# Patient Record
Sex: FEMALE | Race: BLACK OR AFRICAN AMERICAN | URBAN - METROPOLITAN AREA
[De-identification: names, ages, dates, MRNs, and addresses within clinical notes are randomized per-mention and may not be internally consistent; named-entity substitution may affect disease eponyms.]

---

## 2017-01-09 DIAGNOSIS — D46.9 MYELODYSPLASTIC SYNDROME (HCC): ICD-10-CM

## 2017-01-09 RX ORDER — OXYCODONE AND ACETAMINOPHEN 10; 325 MG/1; MG/1
1 TABLET ORAL
Qty: 180 TAB | Refills: 0 | Status: SHIPPED | OUTPATIENT
Start: 2017-01-09 | End: 2017-02-06 | Stop reason: SDUPTHER

## 2017-02-06 ENCOUNTER — OFFICE VISIT (OUTPATIENT)
Dept: ONCOLOGY | Age: 67
End: 2017-02-06

## 2017-02-06 ENCOUNTER — HOSPITAL ENCOUNTER (OUTPATIENT)
Dept: ONCOLOGY | Age: 67
Discharge: HOME | End: 2017-02-06

## 2017-02-06 ENCOUNTER — HOSPITAL ENCOUNTER (OUTPATIENT)
Dept: LAB | Age: 67
Discharge: HOME | End: 2017-02-06
Payer: MEDICARE

## 2017-02-06 VITALS
SYSTOLIC BLOOD PRESSURE: 157 MMHG | HEART RATE: 79 BPM | BODY MASS INDEX: 27.67 KG/M2 | TEMPERATURE: 97.9 F | DIASTOLIC BLOOD PRESSURE: 79 MMHG | WEIGHT: 182 LBS

## 2017-02-06 DIAGNOSIS — D46.9 MYELODYSPLASTIC SYNDROME (HCC): ICD-10-CM

## 2017-02-06 DIAGNOSIS — D46.9 MYELODYSPLASTIC SYNDROME, UNSPECIFIED (HCC): Primary | ICD-10-CM

## 2017-02-06 DIAGNOSIS — D46.9 MYELODYSPLASTIC SYNDROME, UNSPECIFIED (HCC): ICD-10-CM

## 2017-02-06 DIAGNOSIS — M19.90 ARTHRITIS: ICD-10-CM

## 2017-02-06 LAB
ALBUMIN SERPL BCP-MCNC: 4.3 G/DL (ref 3.4–5)
ALBUMIN/GLOB SERPL: 1.1 {RATIO} (ref 0.8–1.7)
ALP SERPL-CCNC: 101 U/L (ref 45–117)
ALT SERPL-CCNC: 23 U/L (ref 13–56)
ANION GAP BLD CALC-SCNC: 12 MMOL/L (ref 3–18)
AST SERPL W P-5'-P-CCNC: 28 U/L (ref 15–37)
BASO+EOS+MONOS # BLD AUTO: 0.2 K/UL (ref 0–2.3)
BASO+EOS+MONOS # BLD AUTO: 4 % (ref 0.1–17)
BILIRUB SERPL-MCNC: 0.4 MG/DL (ref 0.2–1)
BUN SERPL-MCNC: 8 MG/DL (ref 7–18)
BUN/CREAT SERPL: 16 (ref 12–20)
CALCIUM SERPL-MCNC: 10.2 MG/DL (ref 8.5–10.1)
CHLORIDE SERPL-SCNC: 108 MMOL/L (ref 100–108)
CO2 SERPL-SCNC: 23 MMOL/L (ref 21–32)
CREAT SERPL-MCNC: 0.49 MG/DL (ref 0.6–1.3)
DIFFERENTIAL METHOD BLD: ABNORMAL
ERYTHROCYTE [DISTWIDTH] IN BLOOD BY AUTOMATED COUNT: 19 % (ref 11.5–14.5)
FERRITIN SERPL-MCNC: 69 NG/ML (ref 8–388)
GLOBULIN SER CALC-MCNC: 3.8 G/DL (ref 2–4)
GLUCOSE SERPL-MCNC: 106 MG/DL (ref 74–99)
HCT VFR BLD AUTO: 36.2 % (ref 36–48)
HGB BLD-MCNC: 11.5 G/DL (ref 12–16)
IRON SATN MFR SERPL: 12 %
IRON SERPL-MCNC: 34 UG/DL (ref 50–175)
LYMPHOCYTES # BLD AUTO: 20 % (ref 14–44)
LYMPHOCYTES # BLD: 1.2 K/UL (ref 1.1–5.9)
MCH RBC QN AUTO: 28.2 PG (ref 25–35)
MCHC RBC AUTO-ENTMCNC: 31.8 G/DL (ref 31–37)
MCV RBC AUTO: 88.7 FL (ref 78–102)
NEUTS SEG # BLD: 4.4 K/UL (ref 1.8–9.5)
NEUTS SEG NFR BLD AUTO: 76 % (ref 40–70)
PLATELET # BLD AUTO: 313 K/UL (ref 140–440)
POTASSIUM SERPL-SCNC: 4.1 MMOL/L (ref 3.5–5.5)
PROT SERPL-MCNC: 8.1 G/DL (ref 6.4–8.2)
RBC # BLD AUTO: 4.08 M/UL (ref 4.1–5.1)
SODIUM SERPL-SCNC: 143 MMOL/L (ref 136–145)
TIBC SERPL-MCNC: 279 UG/DL (ref 250–450)
WBC # BLD AUTO: 5.8 K/UL (ref 4.5–13)

## 2017-02-06 PROCEDURE — 36415 COLL VENOUS BLD VENIPUNCTURE: CPT | Performed by: NURSE PRACTITIONER

## 2017-02-06 PROCEDURE — 80053 COMPREHEN METABOLIC PANEL: CPT | Performed by: NURSE PRACTITIONER

## 2017-02-06 PROCEDURE — 83540 ASSAY OF IRON: CPT | Performed by: NURSE PRACTITIONER

## 2017-02-06 PROCEDURE — 82306 VITAMIN D 25 HYDROXY: CPT | Performed by: NURSE PRACTITIONER

## 2017-02-06 PROCEDURE — 82728 ASSAY OF FERRITIN: CPT | Performed by: NURSE PRACTITIONER

## 2017-02-06 RX ORDER — LANOLIN ALCOHOL/MO/W.PET/CERES
325 CREAM (GRAM) TOPICAL
Qty: 90 TAB | Refills: 3 | Status: SHIPPED | OUTPATIENT
Start: 2017-02-06

## 2017-02-06 RX ORDER — OXYCODONE AND ACETAMINOPHEN 10; 325 MG/1; MG/1
1 TABLET ORAL
Qty: 180 TAB | Refills: 0 | Status: SHIPPED | OUTPATIENT
Start: 2017-02-06 | End: 2017-03-06 | Stop reason: SDUPTHER

## 2017-02-06 NOTE — MR AVS SNAPSHOT
Visit Information Date & Time Provider Department Dept. Phone Encounter #  
 2/6/2017 11:00 AM Thomas Thrasher MD [facility] Medical Oncology [phone] [id number] Follow-up Instructions Return in about 3 months (around 5/6/2017). Your Appointments 5/1/2017 10:30 AM  
Office Visit with Thomas Thrasher MD  
[address]  Oncology [facility]) Appt Note: 3 month follow up appointment [facility], [address], [address] [place] [address] Upcoming Health Maintenance Date Due Hepatitis C Screening 1950 DTaP/Tdap/Td series (1 - Tdap) 11/19/1969 BREAST CANCER SCRN MAMMOGRAM 11/19/1998 FOBT Q 1 YEAR AGE 50-75 11/19/1998 ZOSTER VACCINE AGE 60> 11/19/2008 GLAUCOMA SCREENING Q2Y 11/19/2013 OSTEOPOROSIS SCREENING (DEXA) 11/19/2013 Pneumococcal 65+ High/Highest Risk (1 of 2 - PCV13) 11/19/2013 MEDICARE YEARLY EXAM 11/19/2013 INFLUENZA AGE 9 TO ADULT 8/1/2016 Allergies as of 2/6/2017  Review Complete On: 2/6/2017 By: Cady Gr NP No Known Allergies Current Immunizations  Never Reviewed No immunizations on file. Not reviewed this visit You Were Diagnosed With   
  
 Codes Comments Myelodysplastic syndrome, unspecified ([facility] [address]    -  Primary ICD-10-CM: D46.9 ICD-9-CM: 238.75 Arthritis     ICD-10-CM: M19.90 ICD-9-CM: 716.90 Myelodysplastic syndrome ([address]     ICD-10-CM: D46.9 ICD-9-CM: 238.75 Vitals BP Pulse Temp Weight(growth percentile) BMI Smoking Status 157/79 79 97.9 °F (36.6 °C) 182 lb (82.6 kg) 27.67 kg/m2 Current Every Day Smoker Vitals History BMI and BSA Data Body Mass Index Body Surface Area  
 27.67 kg/m 2 1.99 m 2 Preferred Pharmacy Pharmacy Name Phone [address] [facility], [address] [place] [address] Your Updated Medication List  
  
   
This list is accurate as of: 2/6/17 12:12 PM.  Always use your most recent med list. amLODIPine 10 mg tablet Commonly known as:  [place] Sae Take  by mouth daily. benazepril 20 mg tablet Commonly known as:  LOTENSIN Take 20 mg by mouth daily. cyclobenzaprine 10 mg tablet Commonly known as:  FLEXERIL Take 1 Tab by mouth three (3) times daily as needed for Muscle Spasm(s). ergocalciferol 50,000 unit capsule Commonly known as:  VITAMIN D2 Take 1 Cap by mouth every seven (7) days. ferrous sulfate 325 mg (65 mg iron) tablet Take 1 Tab by mouth Daily (before breakfast). gabapentin 300 mg capsule Commonly known as:  NEURONTIN  
  
 oxyCODONE-acetaminophen  mg per tablet Commonly known as:  PERCOCET Take 1 Tab by mouth every four (4) hours as needed for Pain.  
  
 pantoprazole 40 mg tablet Commonly known as:  PROTONIX Take 1 Tab by mouth daily. pravastatin 20 mg tablet Commonly known as:  PRAVACHOL Take 20 mg by mouth nightly. PROAIR HFA 90 mcg/actuation inhaler Generic drug:  albuterol Prescriptions Printed Refills  
 oxyCODONE-acetaminophen (PERCOCET)  mg per tablet 0 Sig: Take 1 Tab by mouth every four (4) hours as needed for Pain. Class: Print Route: Oral  
  
Prescriptions Sent to Pharmacy Refills  
 ferrous sulfate 325 mg (65 mg iron) tablet 3 Sig: Take 1 Tab by mouth Daily (before breakfast). Class: Normal  
 Pharmacy: [id number] [facility], [address] [facility] Ph #: [phone] Route: Oral  
  
We Performed the Following COMPLETE CBC & AUTO DIFF WBC [46461 CPT(R)] Follow-up Instructions Return in about 3 months (around 5/6/2017). To-Do List   
 02/06/2017 Lab:  CBC WITH 3 PART DIFF   
  
 02/06/2017 Lab: VITAMIN D, 25 HYDROXY   
  
 02/07/2017 Lab:  FERRITIN   
  
 02/07/2017 Lab:  IRON PROFILE   
  
 02/07/2017 Lab:  METABOLIC PANEL, COMPREHENSIVE Introducing [facility]! Benjamín Garner introduces [facility] patient portal. Now you can access parts of your medical record, email your doctor's office, and request medication refills online. 1. In your internet browser, go to [email or web address] 2. Click on the First Time User? Click Here link in the Sign In box. You will see the New Member Sign Up page. 3. Enter your [facility] Access Code exactly as it appears below. You will not need to use this code after youve completed the sign-up process. If you do not sign up before the expiration date, you must request a new code. · [facility] Access Code: [email or web address] Expires: 5/7/2017 11:06 AM 
 
4. Enter the last four digits of your Social Security Number (xxxx) and Date of Birth (mm/dd/yyyy) as indicated and click Submit. You will be taken to the next sign-up page. 5. Create a [facility] ID. This will be your [facility] login ID and cannot be changed, so think of one that is secure and easy to remember. 6. Create a [facility] password. You can change your password at any time. 7. Enter your Password Reset Question and Answer. This can be used at a later time if you forget your password. 8. Enter your e-mail address. You will receive e-mail notification when new information is available in [address] 9. Click Sign Up. You can now view and download portions of your medical record. 10. Click the Download Summary menu link to download a portable copy of your medical information. If you have questions, please visit the Frequently Asked Questions section of the [facility] website. Remember, [facility] is NOT to be used for urgent needs. For medical emergencies, dial 911. Now available from your iPhone and Android! Please provide this summary of care documentation to your next provider. Your primary care clinician is listed as Grisel Davenport. If you have any questions after today's visit, please call [phone].

## 2017-02-07 LAB — 25(OH)D3 SERPL-MCNC: 8.9 NG/ML (ref 30–100)

## 2017-02-13 RX ORDER — ERGOCALCIFEROL 1.25 MG/1
50000 CAPSULE ORAL
Qty: 12 CAP | Refills: 0 | Status: SHIPPED | OUTPATIENT
Start: 2017-02-13 | End: 2017-05-31 | Stop reason: SDUPTHER

## 2017-03-06 DIAGNOSIS — D46.9 MYELODYSPLASTIC SYNDROME (HCC): ICD-10-CM

## 2017-03-07 RX ORDER — OXYCODONE AND ACETAMINOPHEN 10; 325 MG/1; MG/1
1 TABLET ORAL
Qty: 180 TAB | Refills: 0 | Status: SHIPPED | OUTPATIENT
Start: 2017-03-07 | End: 2017-04-04 | Stop reason: SDUPTHER

## 2017-04-04 DIAGNOSIS — D46.9 MYELODYSPLASTIC SYNDROME (HCC): ICD-10-CM

## 2017-04-04 RX ORDER — OXYCODONE AND ACETAMINOPHEN 10; 325 MG/1; MG/1
1 TABLET ORAL
Qty: 180 TAB | Refills: 0 | Status: SHIPPED | OUTPATIENT
Start: 2017-04-04 | End: 2017-05-02 | Stop reason: SDUPTHER

## 2017-05-01 ENCOUNTER — OFFICE VISIT (OUTPATIENT)
Dept: ONCOLOGY | Age: 67
End: 2017-05-01

## 2017-05-01 ENCOUNTER — HOSPITAL ENCOUNTER (OUTPATIENT)
Dept: ONCOLOGY | Age: 67
Discharge: HOME | End: 2017-05-01

## 2017-05-01 DIAGNOSIS — D46.9 MYELODYSPLASTIC SYNDROME, UNSPECIFIED (HCC): Primary | ICD-10-CM

## 2017-05-01 DIAGNOSIS — D46.9 MYELODYSPLASTIC SYNDROME, UNSPECIFIED (HCC): ICD-10-CM

## 2017-05-02 DIAGNOSIS — D46.9 MYELODYSPLASTIC SYNDROME (HCC): ICD-10-CM

## 2017-05-02 RX ORDER — NALOXONE HYDROCHLORIDE 4 MG/.1ML
1 SPRAY NASAL ONCE
Qty: 1 BOX | Refills: 0 | Status: SHIPPED | OUTPATIENT
Start: 2017-05-02 | End: 2017-05-02

## 2017-05-02 RX ORDER — OXYCODONE AND ACETAMINOPHEN 10; 325 MG/1; MG/1
1 TABLET ORAL
Qty: 180 TAB | Refills: 0 | Status: SHIPPED | OUTPATIENT
Start: 2017-05-02 | End: 2017-05-31 | Stop reason: SDUPTHER

## 2017-05-31 ENCOUNTER — OFFICE VISIT (OUTPATIENT)
Dept: ONCOLOGY | Age: 67
End: 2017-05-31

## 2017-05-31 ENCOUNTER — HOSPITAL ENCOUNTER (OUTPATIENT)
Dept: ONCOLOGY | Age: 67
Discharge: HOME | End: 2017-05-31

## 2017-05-31 VITALS
SYSTOLIC BLOOD PRESSURE: 140 MMHG | BODY MASS INDEX: 28.19 KG/M2 | TEMPERATURE: 98.3 F | DIASTOLIC BLOOD PRESSURE: 69 MMHG | HEART RATE: 77 BPM | WEIGHT: 185.4 LBS

## 2017-05-31 DIAGNOSIS — D46.4 REFRACTORY ANEMIA DUE TO MYELODYSPLASTIC SYNDROME (HCC): ICD-10-CM

## 2017-05-31 DIAGNOSIS — E55.9 VITAMIN D DEFICIENCY: ICD-10-CM

## 2017-05-31 DIAGNOSIS — M19.90 ARTHRITIS: ICD-10-CM

## 2017-05-31 DIAGNOSIS — D46.9 MYELODYSPLASTIC SYNDROME (HCC): ICD-10-CM

## 2017-05-31 DIAGNOSIS — D46.9 MYELODYSPLASTIC SYNDROME, UNSPECIFIED (HCC): ICD-10-CM

## 2017-05-31 DIAGNOSIS — D46.9 MYELODYSPLASTIC SYNDROME, UNSPECIFIED (HCC): Primary | ICD-10-CM

## 2017-05-31 LAB
BASO+EOS+MONOS # BLD AUTO: 0.3 K/UL (ref 0–2.3)
BASO+EOS+MONOS # BLD AUTO: 5 % (ref 0.1–17)
DIFFERENTIAL METHOD BLD: ABNORMAL
ERYTHROCYTE [DISTWIDTH] IN BLOOD BY AUTOMATED COUNT: 14.7 % (ref 11.5–14.5)
HCT VFR BLD AUTO: 35.3 % (ref 36–48)
HGB BLD-MCNC: 11.7 G/DL (ref 12–16)
LYMPHOCYTES # BLD AUTO: 15 % (ref 14–44)
LYMPHOCYTES # BLD: 0.9 K/UL (ref 1.1–5.9)
MCH RBC QN AUTO: 32.1 PG (ref 25–35)
MCHC RBC AUTO-ENTMCNC: 33.1 G/DL (ref 31–37)
MCV RBC AUTO: 97 FL (ref 78–102)
NEUTS SEG # BLD: 4.7 K/UL (ref 1.8–9.5)
NEUTS SEG NFR BLD AUTO: 80 % (ref 40–70)
PLATELET # BLD AUTO: 269 K/UL (ref 140–440)
RBC # BLD AUTO: 3.64 M/UL (ref 4.1–5.1)
WBC # BLD AUTO: 5.9 K/UL (ref 4.5–13)

## 2017-05-31 RX ORDER — ERGOCALCIFEROL 1.25 MG/1
50000 CAPSULE ORAL
Qty: 12 CAP | Refills: 0 | Status: SHIPPED | OUTPATIENT
Start: 2017-05-31 | End: 2017-11-29 | Stop reason: SDUPTHER

## 2017-05-31 RX ORDER — IRON POLYSACCHARIDE COMPLEX 150 MG
150 CAPSULE ORAL 2 TIMES DAILY
Qty: 90 CAP | Refills: 6 | Status: SHIPPED | OUTPATIENT
Start: 2017-05-31

## 2017-05-31 RX ORDER — OXYCODONE AND ACETAMINOPHEN 10; 325 MG/1; MG/1
1 TABLET ORAL
Qty: 180 TAB | Refills: 0 | Status: SHIPPED | OUTPATIENT
Start: 2017-05-31 | End: 2017-06-27 | Stop reason: SDUPTHER

## 2017-05-31 NOTE — MR AVS SNAPSHOT
Visit Information Date & Time Provider Department Dept. Phone Encounter #  
 5/31/2017 11:00 AM Vanessa Baptiste MD [facility] Medical Oncology [phone] [id number] Follow-up Instructions Return in about 3 months (around 8/31/2017). Your Appointments 8/30/2017 11:00 AM  
Office Visit with Vanessa Baptiste MD  
[address]  Oncology [facility]) Appt Note: [facility], [address] [place] [address] Upcoming Health Maintenance Date Due Hepatitis C Screening 1950 DTaP/Tdap/Td series (1 - Tdap) 11/19/1969 BREAST CANCER SCRN MAMMOGRAM 11/19/1998 FOBT Q 1 YEAR AGE 50-75 11/19/1998 ZOSTER VACCINE AGE 60> 11/19/2008 GLAUCOMA SCREENING Q2Y 11/19/2013 OSTEOPOROSIS SCREENING (DEXA) 11/19/2013 Pneumococcal 65+ High/Highest Risk (1 of 2 - PCV13) 11/19/2013 MEDICARE YEARLY EXAM 11/19/2013 INFLUENZA AGE 9 TO ADULT 8/1/2017 Allergies as of 5/31/2017  Review Complete On: 5/31/2017 By: Vanessa Baptiste MD  
 No Known Allergies Current Immunizations  Never Reviewed No immunizations on file. Not reviewed this visit You Were Diagnosed With   
  
 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  
 28.19 kg/m 2 2.01 m 2 Preferred Pharmacy Pharmacy Name Phone [address] [facility], [address] Your Updated Medication List  
  
   
This list is accurate as of: 5/31/17 12:44 PM.  Always use your most recent med list. amLODIPine 10 mg tablet Commonly known as:  Delphina Peat Take  by mouth daily. benazepril 20 mg tablet Commonly known as:  LOTENSIN Take 20 mg by mouth daily. cyclobenzaprine 10 mg tablet Commonly known as:  FLEXERIL Take 1 Tab by mouth three (3) times daily as needed for Muscle Spasm(s). ergocalciferol 50,000 unit capsule Commonly known as:  VITAMIN D2 Take 1 Cap by mouth every seven (7) days. Indications: VITAMIN D DEFICIENCY  
  
 ferrous sulfate 325 mg (65 mg iron) tablet Take 1 Tab by mouth Daily (before breakfast). gabapentin 300 mg capsule Commonly known as:  NEURONTIN  
  
 iron polysaccharides 150 mg iron capsule Commonly known as:  FERREX 150 Take 1 Cap by mouth two (2) times a day. oxyCODONE-acetaminophen  mg per tablet Commonly known as:  PERCOCET Take 1 Tab by mouth every four (4) hours as needed for Pain.  
  
 pantoprazole 40 mg tablet Commonly known as:  PROTONIX Take 1 Tab by mouth daily. pravastatin 20 mg tablet Commonly known as:  PRAVACHOL Take 20 mg by mouth nightly. PROAIR HFA 90 mcg/actuation inhaler Generic drug:  albuterol Prescriptions Printed Refills  
 iron polysaccharides (FERREX 150) 150 mg iron capsule 6 Sig: Take 1 Cap by mouth two (2) times a day. Class: Print Route: Oral  
 ergocalciferol (VITAMIN D2) 50,000 unit capsule 0 Sig: Take 1 Cap by mouth every seven (7) days. Indications: VITAMIN D DEFICIENCY Class: Print  Route: Oral  
 oxyCODONE-acetaminophen (PERCOCET)  mg per tablet 0  
 Sig: Take 1 Tab by mouth every four (4) hours as needed for Pain. Class: Print Route: Oral  
  
We Performed the Following COMPLETE CBC & AUTO DIFF WBC [15932 CPT(R)] Follow-up Instructions Return in about 3 months (around 8/31/2017). To-Do List   
 05/31/2017 Lab:  CBC WITH 3 PART DIFF Patient Instructions Myelodysplastic Syndromes: Care Instructions Your Care Instructions Myelodysplastic syndromes, also called MDS, are a group of rare conditions in which the bone marrow does not make enough healthy blood cells. Normally, the bone marrow makes red blood cells, white blood cells, and platelets. These cells carry oxygen in the blood, help the body fight infections, and help the blood clot. With MDS, you may feel weak and tired, get infections often, and bruise easily, although symptoms tend to vary. MDS is a form of blood cancer. In some cases, MDS can turn into acute myeloid leukemia (AML), another type of cancer. Some people develop MDS after treatment for cancer or exposure to pesticides or other chemicals. But in most cases, the cause of MDS is not known. Your doctor will use the results of blood tests to guide your treatment. There are many types of MDS, with different treatment plans for each. If you have enough red blood cells and are feeling all right, you may not need active treatment, but you and your doctor will want to watch your condition carefully. If you start feeling lightheaded and have no energy, you may need a blood transfusion. Your doctor also may give you antibiotics to prevent or treat infection. Follow-up care is a key part of your treatment and safety. Be sure to make and go to all appointments, and call your doctor if you are having problems. It's also a good idea to know your test results and keep a list of the medicines you take. How can you care for yourself at home? · Take your medicines exactly as prescribed. Call your doctor if you think you are having a problem with your medicine. You will get more details on the specific medicines your doctor prescribes. · If your doctor prescribed antibiotics, take them as directed. Do not stop taking them just because you feel better. You need to take the full course of antibiotics. If you have side effects from antibiotics, tell your doctor. · Take steps to control your stress and workload. Learn relaxation techniques. ¨ Share your feelings. Stress and tension affect our emotions. By expressing your feelings to others, you may be able to understand and cope with them. ¨ Consider joining a support group. Talking about a problem with your spouse, a good friend, or other people with similar problems is a good way to reduce tension and stress. ¨ Express yourself with art. Try writing, crafts, dance, or art to relieve stress. ¨ Be kind to your body and mind. Getting enough sleep, eating a healthy diet, and taking time to do things you enjoy can contribute to an overall feeling of balance in your life and help reduce stress. ¨ Get help if you need it. Discuss your concerns with your doctor or counselor. · Do not smoke. Smoking can make blood problems worse. If you need help quitting, talk to your doctor about stop-smoking programs and medicines. These can increase your chances of quitting for good. · If you have not already done so, prepare a list of advance directives. Advance directives are instructions to your doctor and family members about what kind of care you want if you become unable to speak or express yourself. · Call the [employer] (7-335.989.2197) or visit its website at [employer] [employer] for more information. When should you call for help? Call 911 anytime you think you may need emergency care. For example, call if: 
· You passed out (lost consciousness). · You vomit blood or what looks like coffee grounds. · You pass maroon or very bloody stools. Call your doctor now or seek immediate medical care if: 
· Your stools are black and tarlike or have streaks of blood. · You have any unusual bleeding, such as: ¨ Blood spots under the skin. ¨ A nosebleed that you cannot stop. ¨ Bleeding gums when you brush your teeth. ¨ Blood in your urine. ¨ Vaginal bleeding when you are not having your period, or heavy period bleeding. · Your fatigue and weakness continue or get worse. · You have signs of infection, such as: 
¨ Increased pain, swelling, warmth, or redness. ¨ Red streaks leading from a sore. ¨ Pus draining from a sore. ¨ A fever. Watch closely for changes in your health, and be sure to contact your doctor if you have any problems. Where can you learn more? Go to http://marjorie-marianela.info/. Enter Cecily Denver in the search box to learn more about \"Myelodysplastic Syndromes: Care Instructions. \" Current as of: October 24, 2016 Content Version: 11.2 © 5849-1511 [employer]. Care instructions adapted under license by [employer] (which disclaims liability or warranty for this information). If you have questions about a medical condition or this instruction, always ask your healthcare professional. [address] any warranty or liability for your use of this information. Introducing [facility] & HEALTH SERVICES! Vamshi Lara introduces [employer] patient portal. Now you can access parts of your medical record, email your doctor's office, and request medication refills online. 1. In your internet browser, go to [email or web address] 2. Click on the First Time User? Click Here link in the Sign In box. You will see the New Member Sign Up page. 3. Enter your [employer] Access Code exactly as it appears below. You will not need to use this code after youve completed the sign-up process.  If you do not sign up before the expiration date, you must request a new code. · [employer] Access Code: [email or web address] Expires: 8/29/2017 11:23 AM 
 
4. Enter the last four digits of your Social Security Number (xxxx) and Date of Birth (mm/dd/yyyy) as indicated and click Submit. You will be taken to the next sign-up page. 5. Create a [employer] ID. This will be your [employer] login ID and cannot be changed, so think of one that is secure and easy to remember. 6. Create a [employer] password. You can change your password at any time. 7. Enter your Password Reset Question and Answer. This can be used at a later time if you forget your password. 8. Enter your e-mail address. You will receive e-mail notification when new information is available in [address] 9. Click Sign Up. You can now view and download portions of your medical record. 10. Click the Download Summary menu link to download a portable copy of your medical information. If you have questions, please visit the Frequently Asked Questions section of the [employer] website. Remember, [employer] is NOT to be used for urgent needs. For medical emergencies, dial 911. Now available from your iPhone and Android! Please provide this summary of care documentation to your next provider. Your primary care clinician is listed as Jayashree Powell. If you have any questions after today's visit, please call [phone].

## 2017-05-31 NOTE — PROGRESS NOTES
Hematology/Oncology  Progress Note    Name: Cathy Mayers  Date: 2017  : 1950    PCP:Khadra Ingram MD      Ms. Lubna Camara is a 74year old female who was seen for management of her myelodysplastic syndrome/refractory anemia. The patient also has quite severe arthritis. Current therapy: Percocet 10 mg 1 tablet every 4-6 hours as needed for pain control    Subjective:     Ms. Lubna Camara is a 78-year-old  woman who has myelodysplastic syndrome with refractory anemia. She also suffers from quite severe arthritis involving her back, wrist and knees. She is ambulating with the use of a cane at this time. She is treated with Steroids by her rheumatologist and she also uses Percocet as needed. Her stomach pains improved with the use of Prilosec 40mg daily. Today she is complaining of severe arthritic discomfort and pain in her hands and arms. She is being treated with Prednisone in addition to the pain medication which is providing some relief. Her appetite and energy level are good. She denies fevers, chest pains, or shortness of breath. She has no additional complaints. The patient is requesting a renewal of her Percocet prescription and iron pills today. She states that she did not  her Vitamin D from the pharmacy during her previous visit so she needs a new prescription today. Past medical history, family history, and social history: these were reviewed and remains unchanged. Past Medical History:   Diagnosis Date    Chronic pain     Chronic leg pain    Hypercholesterolemia     Hypertension     Myelodysplastic syndrome, unspecified ([address]     Refractory Anemia      Past Surgical History:   Procedure Laterality Date   [facility]  [id number]    Work related back injury     Social History     Social History    Marital status: UNKNOWN     Spouse name: N/A    Number of children: N/A    Years of education: N/A     Occupational History    Not on file.      Social History Main Topics    Smoking status: Current Every Day Smoker     Packs/day: 1.50     Types: Cigarettes    Smokeless tobacco: Not on file    Alcohol use Yes      Comment: Occasional - beer    Drug use: Not on file    Sexual activity: Not on file     Other Topics Concern    Not on file     Social History Narrative     No family history on file. Current Outpatient Prescriptions   Medication Sig Dispense Refill    iron polysaccharides (FERREX 150) 150 mg iron capsule Take 1 Cap by mouth two (2) times a day. 90 Cap 6    ergocalciferol (VITAMIN D2) 50,000 unit capsule Take 1 Cap by mouth every seven (7) days. Indications: VITAMIN D DEFICIENCY 12 Cap 0    oxyCODONE-acetaminophen (PERCOCET)  mg per tablet Take 1 Tab by mouth every four (4) hours as needed for Pain. 180 Tab 0    ferrous sulfate 325 mg (65 mg iron) tablet Take 1 Tab by mouth Daily (before breakfast). 90 Tab 3    pantoprazole (PROTONIX) 40 mg tablet Take 1 Tab by mouth daily. 30 Tab 1    PROAIR HFA 90 mcg/actuation inhaler       gabapentin (NEURONTIN) 300 mg capsule       cyclobenzaprine (FLEXERIL) 10 mg tablet Take 1 Tab by mouth three (3) times daily as needed for Muscle Spasm(s). 90 Tab 6    benazepril (LOTENSIN) 20 mg tablet Take 20 mg by mouth daily.  pravastatin (PRAVACHOL) 20 mg tablet Take 20 mg by mouth nightly.  amLODIPine (NORVASC) 10 mg tablet Take  by mouth daily. Review of Systems  Constitutional: The patient has complains of pain in the right hand and wrist pain due to severe arthritis. HEENT: The patient denies recent head trauma, eye pain, blurred vision,  hearing deficit, oropharyngeal mucosal pain or lesions, and the patient denies throat pain or discomfort. Lymphatics: The patient denies palpable peripheral lymphadenopathy. Hematologic: The patient denies having bruising, bleeding, or progressive fatigue.   Respiratory: Patient denies having shortness of breath, cough, sputum production, fever, or dyspnea on exertion. Cardiovascular: The patient denies having leg pain, leg swelling, heart palpitations, chest permit, chest pain, or lightheadedness. The patient denies having dyspnea on exertion. Gastrointestinal: The patient denies having nausea, emesis, or diarrhea. The patient denies having any hematemesis or blood in the stool. Genitourinary: Patient denies having urinary urgency, frequency, or dysuria. The patient denies having blood in the urine. Psychological: The patient denies having symptoms of nervousness, anxiety, depression, or thoughts of harming himself some of this. Skin: Patient denies having skin rashes, skin, ulcerations, or unexplained itching or pruritus. Musculoskeletal: The patient complains of pain in right hand and wrist.      Objective:     Visit Vitals    /69    Pulse 77    Temp 98.3 °F (36.8 °C)    Wt 84.1 kg (185 lb 6.4 oz)    BMI 28.19 kg/m2     Pain 6/10 ECOG 0  Physical Exam:   Gen. Appearance: The patient is in no acute distress. Skin: There is no bruise or rash. HEENT: The exam is unremarkable. Neck: Supple without lymphadenopathy or thyromegaly. Lungs: Clear to auscultation and percussion; there are no wheezes or rhonchi. Heart: Regular rate and rhythm; there are no murmurs, gallops, or rubs. Abdomen: Bowel sounds are present and normal.  There is no guarding, tenderness, or hepatosplenomegaly. Extremities: There is no clubbing, cyanosis, or edema. Neurologic: There are no focal neurologic deficits. Lymphatics: There is no palpable peripheral lymphadenopathy. Musculoskeletal: The patient has full range of motion at all joints. The patient has some deformity in her knee joints bilaterally. She is using a cane for mobility support. Psychological/psychiatric: There is no clinical evidence of anxiety, depression, or melancholy.     Lab data:      Results for orders placed or performed during the hospital encounter of 05/31/17   CBC WITH 3 PART DIFF     Status: Abnormal   Result Value Ref Range Status    WBC 5.9 4.5 - 13.0 K/uL Final    RBC 3.64 (L) 4.10 - 5.10 M/uL Final    HGB 11.7 (L) 12.0 - 16 g/dL Final    HCT 35.3 (L) 36 - 48 % Final    MCV 97.0 78 - 102 FL Final    MCH 32.1 25.0 - 35.0 PG Final    MCHC 33.1 31 - 37 g/dL Final    RDW 14.7 (H) 11.5 - 14.5 % Final    PLATELET 595 032 - 158 K/uL Final    NEUTROPHILS 80 (H) 40 - 70 % Final    MIXED CELLS 5 0.1 - 17 % Final    LYMPHOCYTES 15 14 - 44 % Final    ABS. NEUTROPHILS 4.7 1.8 - 9.5 K/UL Final    ABS. MIXED CELLS 0.3 0.0 - 2.3 K/uL Final    ABS. LYMPHOCYTES 0.9 (L) 1.1 - 5.9 K/UL Final     Comment: Test performed at [address] Results Reviewed by Medical Director. DF AUTOMATED   Final           Assessment:     1. Myelodysplastic syndrome, unspecified ([facility] [address]    2. Refractory anemia due to myelodysplastic syndrome ([facility] [address]    3. Arthritis    4. Vitamin D deficiency    5. Myelodysplastic syndrome ([facility]      Plan:     Myelodysplastic syndrome/refractory anemia: I have explained to the patient  that the CBC today revealed a H/H of 11.7g/dl and 35.3% . Procrit will be started if hemoglobin declines below 10 g/dL and hematocrit declines below 30%. A ferritin, iron profile, and CMP will also be obtained. Arthritis(progressive problem): The patient is complaining of progressive pain in the right wrist and hand. She continues to use a narcotic based pain medication in addition to Prednisone for pain control. I will renew her Percocet Rx today. Vitamin D Deficiency: On 2/7 her Vitamin D level was 8.9. Vitamin D2 50,000 units was ordered during this visit but she states she was not able to pick it up from the pharmacy. Today, a new prescription for Vitamin D2 50,000  units to be taken every 7 days is ordered. Follow-up will occur in 3 months for a complete reassessment.         Orders Placed This Encounter    COMPLETE CBC & AUTO DIFF WBC    InHouse CBC ([facility])     Standing Status: Future     Number of Occurrences:   1     Standing Expiration Date:   6/7/2017    FERRITIN     Standing Status:   Future     Standing Expiration Date:   6/1/2018    IRON PROFILE     Standing Status:   Future     Standing Expiration Date:   4/7/3630    METABOLIC PANEL, COMPREHENSIVE     Standing Status:   Future     Standing Expiration Date:   6/1/2018    iron polysaccharides (FERREX 150) 150 mg iron capsule     Sig: Take 1 Cap by mouth two (2) times a day. Dispense:  90 Cap     Refill:  6    ergocalciferol (VITAMIN D2) 50,000 unit capsule     Sig: Take 1 Cap by mouth every seven (7) days. Indications: VITAMIN D DEFICIENCY     Dispense:  12 Cap     Refill:  0    oxyCODONE-acetaminophen (PERCOCET)  mg per tablet     Sig: Take 1 Tab by mouth every four (4) hours as needed for Pain. Dispense:  180 Tab     Refill:  0       5/31/2017   Que العراقي MD, [address]

## 2017-05-31 NOTE — PATIENT INSTRUCTIONS
Myelodysplastic Syndromes: Care Instructions  Your Care Instructions  Myelodysplastic syndromes, also called MDS, are a group of rare conditions in which the bone marrow does not make enough healthy blood cells. Normally, the bone marrow makes red blood cells, white blood cells, and platelets. These cells carry oxygen in the blood, help the body fight infections, and help the blood clot. With MDS, you may feel weak and tired, get infections often, and bruise easily, although symptoms tend to vary. MDS is a form of blood cancer. In some cases, MDS can turn into acute myeloid leukemia (AML), another type of cancer. Some people develop MDS after treatment for cancer or exposure to pesticides or other chemicals. But in most cases, the cause of MDS is not known. Your doctor will use the results of blood tests to guide your treatment. There are many types of MDS, with different treatment plans for each. If you have enough red blood cells and are feeling all right, you may not need active treatment, but you and your doctor will want to watch your condition carefully. If you start feeling lightheaded and have no energy, you may need a blood transfusion. Your doctor also may give you antibiotics to prevent or treat infection. Follow-up care is a key part of your treatment and safety. Be sure to make and go to all appointments, and call your doctor if you are having problems. It's also a good idea to know your test results and keep a list of the medicines you take. How can you care for yourself at home? · Take your medicines exactly as prescribed. Call your doctor if you think you are having a problem with your medicine. You will get more details on the specific medicines your doctor prescribes. · If your doctor prescribed antibiotics, take them as directed. Do not stop taking them just because you feel better. You need to take the full course of antibiotics.  If you have side effects from antibiotics, tell your doctor. · Take steps to control your stress and workload. Learn relaxation techniques. ¨ Share your feelings. Stress and tension affect our emotions. By expressing your feelings to others, you may be able to understand and cope with them. ¨ Consider joining a support group. Talking about a problem with your spouse, a good friend, or other people with similar problems is a good way to reduce tension and stress. ¨ Express yourself with art. Try writing, crafts, dance, or art to relieve stress. ¨ Be kind to your body and mind. Getting enough sleep, eating a healthy diet, and taking time to do things you enjoy can contribute to an overall feeling of balance in your life and help reduce stress. ¨ Get help if you need it. Discuss your concerns with your doctor or counselor. · Do not smoke. Smoking can make blood problems worse. If you need help quitting, talk to your doctor about stop-smoking programs and medicines. These can increase your chances of quitting for good. · If you have not already done so, prepare a list of advance directives. Advance directives are instructions to your doctor and family members about what kind of care you want if you become unable to speak or express yourself. · Call the [employer] (3-673.997.9872) or visit its website at [employer] [employer] for more information. When should you call for help? Call 911 anytime you think you may need emergency care. For example, call if:  · You passed out (lost consciousness). · You vomit blood or what looks like coffee grounds. · You pass maroon or very bloody stools. Call your doctor now or seek immediate medical care if:  · Your stools are black and tarlike or have streaks of blood. · You have any unusual bleeding, such as:  ¨ Blood spots under the skin. ¨ A nosebleed that you cannot stop. ¨ Bleeding gums when you brush your teeth. ¨ Blood in your urine.   ¨ Vaginal bleeding when you are not having your period, or heavy period bleeding. · Your fatigue and weakness continue or get worse. · You have signs of infection, such as:  ¨ Increased pain, swelling, warmth, or redness. ¨ Red streaks leading from a sore. ¨ Pus draining from a sore. ¨ A fever. Watch closely for changes in your health, and be sure to contact your doctor if you have any problems. Where can you learn more? Go to http://marjorie-marianela.info/. Enter Lindsey Harden in the search box to learn more about \"Myelodysplastic Syndromes: Care Instructions. \"  Current as of: October 24, 2016  Content Version: 11.2  © 5543-6441 [employer]. Care instructions adapted under license by [employer] (which disclaims liability or warranty for this information). If you have questions about a medical condition or this instruction, always ask your healthcare professional. [address] any warranty or liability for your use of this information.

## 2017-06-02 LAB
ALBUMIN SERPL-MCNC: 4.6 G/DL (ref 3.6–4.8)
ALBUMIN/GLOB SERPL: 1.6 {RATIO} (ref 1.2–2.2)
ALP SERPL-CCNC: 72 IU/L (ref 39–117)
ALT SERPL-CCNC: 17 IU/L (ref 0–32)
AST SERPL-CCNC: 20 IU/L (ref 0–40)
BILIRUB SERPL-MCNC: <0.2 MG/DL (ref 0–1.2)
BUN SERPL-MCNC: 10 MG/DL (ref 8–27)
BUN/CREAT SERPL: 20 (ref 12–28)
CALCIUM SERPL-MCNC: 9.9 MG/DL (ref 8.7–10.3)
CHLORIDE SERPL-SCNC: 104 MMOL/L (ref 96–106)
CO2 SERPL-SCNC: 24 MMOL/L (ref 18–29)
CREAT SERPL-MCNC: 0.51 MG/DL (ref 0.57–1)
FERRITIN SERPL-MCNC: 103 NG/ML (ref 15–150)
GLOBULIN SER CALC-MCNC: 2.9 G/DL (ref 1.5–4.5)
GLUCOSE SERPL-MCNC: 95 MG/DL (ref 65–99)
IRON SATN MFR SERPL: 16 % (ref 15–55)
IRON SERPL-MCNC: 42 UG/DL (ref 27–139)
POTASSIUM SERPL-SCNC: 4.3 MMOL/L (ref 3.5–5.2)
PROT SERPL-MCNC: 7.5 G/DL (ref 6–8.5)
SODIUM SERPL-SCNC: 144 MMOL/L (ref 134–144)
TIBC SERPL-MCNC: 260 UG/DL (ref 250–450)
UIBC SERPL-MCNC: 218 UG/DL (ref 118–369)

## 2017-06-27 DIAGNOSIS — D46.9 MYELODYSPLASTIC SYNDROME (HCC): ICD-10-CM

## 2017-06-27 RX ORDER — OXYCODONE AND ACETAMINOPHEN 10; 325 MG/1; MG/1
1 TABLET ORAL
Qty: 180 TAB | Refills: 0 | Status: SHIPPED | OUTPATIENT
Start: 2017-06-27 | End: 2017-07-31 | Stop reason: SDUPTHER

## 2017-07-31 DIAGNOSIS — D46.9 MYELODYSPLASTIC SYNDROME (HCC): ICD-10-CM

## 2017-07-31 RX ORDER — OXYCODONE AND ACETAMINOPHEN 10; 325 MG/1; MG/1
1 TABLET ORAL
Qty: 180 TAB | Refills: 0 | Status: CANCELLED | OUTPATIENT
Start: 2017-07-31

## 2017-07-31 RX ORDER — OXYCODONE AND ACETAMINOPHEN 10; 325 MG/1; MG/1
1 TABLET ORAL
Qty: 180 TAB | Refills: 0 | Status: SHIPPED | OUTPATIENT
Start: 2017-07-31 | End: 2017-08-30 | Stop reason: SDUPTHER

## 2017-08-30 ENCOUNTER — OFFICE VISIT (OUTPATIENT)
Dept: ONCOLOGY | Age: 67
End: 2017-08-30

## 2017-08-30 ENCOUNTER — HOSPITAL ENCOUNTER (OUTPATIENT)
Dept: ONCOLOGY | Age: 67
Discharge: HOME | End: 2017-08-30

## 2017-08-30 VITALS
SYSTOLIC BLOOD PRESSURE: 146 MMHG | BODY MASS INDEX: 28.74 KG/M2 | WEIGHT: 189 LBS | HEART RATE: 69 BPM | DIASTOLIC BLOOD PRESSURE: 71 MMHG | TEMPERATURE: 98.3 F

## 2017-08-30 DIAGNOSIS — D46.9 MYELODYSPLASTIC SYNDROME (HCC): ICD-10-CM

## 2017-08-30 DIAGNOSIS — E55.9 VITAMIN D DEFICIENCY: ICD-10-CM

## 2017-08-30 DIAGNOSIS — D46.4 REFRACTORY ANEMIA DUE TO MYELODYSPLASTIC SYNDROME (HCC): ICD-10-CM

## 2017-08-30 DIAGNOSIS — M19.90 ARTHRITIS: ICD-10-CM

## 2017-08-30 DIAGNOSIS — D46.9 MYELODYSPLASTIC SYNDROME, UNSPECIFIED (HCC): Primary | ICD-10-CM

## 2017-08-30 DIAGNOSIS — D46.9 MYELODYSPLASTIC SYNDROME, UNSPECIFIED (HCC): ICD-10-CM

## 2017-08-30 LAB
BASO+EOS+MONOS # BLD AUTO: 0.2 K/UL (ref 0–2.3)
BASO+EOS+MONOS # BLD AUTO: 4 % (ref 0.1–17)
DIFFERENTIAL METHOD BLD: ABNORMAL
ERYTHROCYTE [DISTWIDTH] IN BLOOD BY AUTOMATED COUNT: 14.3 % (ref 11.5–14.5)
HCT VFR BLD AUTO: 35.9 % (ref 36–48)
HGB BLD-MCNC: 12.2 G/DL (ref 12–16)
LYMPHOCYTES # BLD: 1.1 K/UL (ref 1.1–5.9)
LYMPHOCYTES NFR BLD: 19 % (ref 14–44)
MCH RBC QN AUTO: 33 PG (ref 25–35)
MCHC RBC AUTO-ENTMCNC: 34 G/DL (ref 31–37)
MCV RBC AUTO: 97 FL (ref 78–102)
NEUTS SEG # BLD: 4.9 K/UL (ref 1.8–9.5)
NEUTS SEG NFR BLD: 78 % (ref 40–70)
PLATELET # BLD AUTO: 289 K/UL (ref 140–440)
RBC # BLD AUTO: 3.7 M/UL (ref 4.1–5.1)
WBC # BLD AUTO: 6.2 K/UL (ref 4.5–13)

## 2017-08-30 RX ORDER — OXYCODONE AND ACETAMINOPHEN 10; 325 MG/1; MG/1
1 TABLET ORAL
Qty: 120 TAB | Refills: 0 | Status: SHIPPED | OUTPATIENT
Start: 2017-08-30 | End: 2017-09-27 | Stop reason: SDUPTHER

## 2017-08-30 NOTE — PROGRESS NOTES
Hematology/Oncology  Progress Note    Name: Rob Cuadra  Date: 2017  : 1950    PCP:Khadra Juan MD      Ms. Marcia Martell is a 74year old female who was seen for management of her myelodysplastic syndrome/refractory anemia. The patient also has quite severe arthritis. Current therapy: Percocet 10 mg 1 tablet every 6 hours as needed for pain control    Subjective:     Ms. Marcia Martell is a 53-year-old  woman who has myelodysplastic syndrome with refractory anemia. She also suffers from quite severe arthritis involving her neck, back, wrist and knees. She is ambulating with the use of a cane at this time. She is treated with Steroids by her rheumatologist and she also uses Percocet as needed. Today she is complaining of severe arthritic discomfort and pain in her neck. She is being treated with Prednisone in addition to the pain medication which is providing some relief. Her appetite and energy level are good. She denies fevers, chest pains, or shortness of breath. She has no additional complaints. The patient is requesting a renewal of her Percocet prescription and iron pills today. She states that she did not  her  remianingVitamin D from the pharmacy but she will do so today. Past medical history, family history, and social history: these were reviewed and remains unchanged. Past Medical History:   Diagnosis Date    Chronic pain     Chronic leg pain    Hypercholesterolemia     Hypertension     Myelodysplastic syndrome, unspecified ([facility] [address]     Refractory Anemia      Past Surgical History:   Procedure Laterality Date   [facility] SURGERY  [id number]    Work related back injury     Social History     Social History    Marital status: UNKNOWN     Spouse name: N/A    Number of children: N/A    Years of education: N/A     Occupational History    Not on file.      Social History Main Topics    Smoking status: Current Every Day Smoker     Packs/day: 1.50     Types: Cigarettes    Smokeless tobacco: Not on file    Alcohol use Yes      Comment: Occasional - beer    Drug use: Not on file    Sexual activity: Not on file     Other Topics Concern    Not on file     Social History Narrative     No family history on file. Current Outpatient Prescriptions   Medication Sig Dispense Refill    oxyCODONE-acetaminophen (PERCOCET)  mg per tablet Take 1 Tab by mouth every six (6) hours as needed for Pain. Max Daily Amount: 4 Tabs. 120 Tab 0    naloxone (NARCAN) 2 mg/actuation spry Use 1 spray intranasally into 1 nostril. Use a new Narcan nasal spray for subsequent doses and administer into alternating nostrils. May repeat every 2 to 3 minutes as needed. Indications: OPIATE-INDUCED RESPIRATORY DEPRESSION, OPIOID TOXICITY 1 Box 0    iron polysaccharides (FERREX 150) 150 mg iron capsule Take 1 Cap by mouth two (2) times a day. 90 Cap 6    ergocalciferol (VITAMIN D2) 50,000 unit capsule Take 1 Cap by mouth every seven (7) days. Indications: VITAMIN D DEFICIENCY 12 Cap 0    ferrous sulfate 325 mg (65 mg iron) tablet Take 1 Tab by mouth Daily (before breakfast). 90 Tab 3    pantoprazole (PROTONIX) 40 mg tablet Take 1 Tab by mouth daily. 30 Tab 1    PROAIR HFA 90 mcg/actuation inhaler       gabapentin (NEURONTIN) 300 mg capsule       cyclobenzaprine (FLEXERIL) 10 mg tablet Take 1 Tab by mouth three (3) times daily as needed for Muscle Spasm(s). 90 Tab 6    benazepril (LOTENSIN) 20 mg tablet Take 20 mg by mouth daily.  pravastatin (PRAVACHOL) 20 mg tablet Take 20 mg by mouth nightly.  amLODIPine (NORVASC) 10 mg tablet Take  by mouth daily. Review of Systems  Constitutional: The patient has complains of pain in the neck due to severe arthritis. HEENT: The patient denies recent head trauma, eye pain, blurred vision,  hearing deficit, oropharyngeal mucosal pain or lesions, and the patient denies throat pain or discomfort. Lymphatics:  The patient denies palpable peripheral lymphadenopathy. Hematologic: The patient denies having bruising, bleeding, or progressive fatigue. Respiratory: Patient denies having shortness of breath, cough, sputum production, fever, or dyspnea on exertion. Cardiovascular: The patient denies having leg pain, leg swelling, heart palpitations, chest permit, chest pain, or lightheadedness. The patient denies having dyspnea on exertion. Gastrointestinal: The patient denies having nausea, emesis, or diarrhea. The patient denies having any hematemesis or blood in the stool. Genitourinary: Patient denies having urinary urgency, frequency, or dysuria. The patient denies having blood in the urine. Psychological: The patient denies having symptoms of nervousness, anxiety, depression, or thoughts of harming himself some of this. Skin: Patient denies having skin rashes, skin, ulcerations, or unexplained itching or pruritus. Musculoskeletal: The patient complains of pain in neck      Objective:     Visit Vitals    /71 (BP 1 Location: Right arm, BP Patient Position: Sitting)    Pulse 69    Temp 98.3 °F (36.8 °C) (Oral)    Wt 85.7 kg (189 lb)    BMI 28.74 kg/m2     Pain 6/10 ECOG 0  Physical Exam:   Gen. Appearance: The patient is in no acute distress. Skin: There is no bruise or rash. HEENT: The exam is unremarkable. Neck: Supple without lymphadenopathy or thyromegaly. Lungs: Clear to auscultation and percussion; there are no wheezes or rhonchi. Heart: Regular rate and rhythm; there are no murmurs, gallops, or rubs. Abdomen: Bowel sounds are present and normal.  There is no guarding, tenderness, or hepatosplenomegaly. Extremities: There is no clubbing, cyanosis, or edema. Neurologic: There are no focal neurologic deficits. Lymphatics: There is no palpable peripheral lymphadenopathy. Musculoskeletal: The patient has full range of motion at all joints. The patient has some deformity in her knee joints bilaterally.   She is using a cane for mobility support. Psychological/psychiatric: There is no clinical evidence of anxiety, depression, or melancholy. Lab data:      Results for orders placed or performed during the hospital encounter of 08/30/17   CBC WITH 3 PART DIFF     Status: Abnormal   Result Value Ref Range Status    WBC 6.2 4.5 - 13.0 K/uL Final    RBC 3.70 (L) 4.10 - 5.10 M/uL Final    HGB 12.2 12.0 - 16 g/dL Final    HCT 35.9 (L) 36 - 48 % Final    MCV 97.0 78 - 102 FL Final    MCH 33.0 25.0 - 35.0 PG Final    MCHC 34.0 31 - 37 g/dL Final    RDW 14.3 11.5 - 14.5 % Final    PLATELET 068 581 - 485 K/uL Final    NEUTROPHILS 78 (H) 40 - 70 % Final    MIXED CELLS 4 0.1 - 17 % Final    LYMPHOCYTES 19 14 - 44 % Final    ABS. NEUTROPHILS 4.9 1.8 - 9.5 K/UL Final    ABS. MIXED CELLS 0.2 0.0 - 2.3 K/uL Final    ABS. LYMPHOCYTES 1.1 1.1 - 5.9 K/UL Final     Comment: Test performed at [address]. Results Reviewed by Medical Director. DF AUTOMATED   Final           Assessment:     1. Myelodysplastic syndrome, unspecified ([facility] [address]    2. Vitamin D deficiency    3. Refractory anemia due to myelodysplastic syndrome ([facility] [address]    4. Myelodysplastic syndrome ([facility] [address]    5. Arthritis      Plan:     Myelodysplastic syndrome/refractory anemia: I have explained to the patient  that the CBC today revealed a H/H of 12.2g/dl and 35.9% . Procrit will be started if hemoglobin declines below 10 g/dL and hematocrit declines below 30%. A ferritin, iron profile, and CMP will also be obtained. Arthritis(progressive problem): The patient is complaining of progressive pain in the neck. Also back and right wrist and hand. She continues to use a narcotic based pain medication in addition to Prednisone for pain control. I will renew her Percocet Rx today. Vitamin D Deficiency: On 2/7 her Vitamin D level was 8.9. Vitamin D2 50,000 units was ordered and states she only took 4 weeks. The patient was advised to complete the entire 12 week prescription. A vitamin d level will be obtained at this time. Follow-up will occur in 3 months for a complete reassessment. Orders Placed This Encounter    COMPLETE CBC & AUTO DIFF WBC    InHouse CBC ([facility])     Standing Status:   Future     Number of Occurrences:   1     Standing Expiration Date:   1/9/5920    METABOLIC PANEL, COMPREHENSIVE     Standing Status:   Future     Number of Occurrences:   1     Standing Expiration Date:   8/31/2018    VITAMIN D, 25 HYDROXY     Standing Status:   Future     Number of Occurrences:   1     Standing Expiration Date:   8/31/2017    IRON PROFILE     Standing Status:   Future     Number of Occurrences:   1     Standing Expiration Date:   8/31/2018    FERRITIN     Standing Status:   Future     Number of Occurrences:   1     Standing Expiration Date:   8/31/2018    oxyCODONE-acetaminophen (PERCOCET)  mg per tablet     Sig: Take 1 Tab by mouth every six (6) hours as needed for Pain. Max Daily Amount: 4 Tabs. Dispense:  120 Tab     Refill:  0         Que Leary MD  8/30/2017

## 2017-08-30 NOTE — MR AVS SNAPSHOT
Visit Information Date & Time Provider Department Dept. Phone Encounter #  
 8/30/2017 11:00 AM Cholo Bedolla MD [facility]  [facility] [phone] [id number] Follow-up Instructions Return in about 3 months (around 11/30/2017). Your Appointments 11/29/2017 10:00 AM  
Office Visit with Cholo Bedolla MD  
[address]  Oncology [facility]) Appt Note: 3 MO RET  
 [address], [address] [place] [address] Upcoming Health Maintenance Date Due Hepatitis C Screening 1950 DTaP/Tdap/Td series (1 - Tdap) 11/19/1969 BREAST CANCER SCRN MAMMOGRAM 11/19/1998 FOBT Q 1 YEAR AGE 50-75 11/19/1998 ZOSTER VACCINE AGE 60> 9/19/2008 GLAUCOMA SCREENING Q2Y 11/19/2013 OSTEOPOROSIS SCREENING (DEXA) 11/19/2013 Pneumococcal 65+ High/Highest Risk (1 of 2 - PCV13) 11/19/2013 MEDICARE YEARLY EXAM 11/19/2013 INFLUENZA AGE 9 TO ADULT 8/1/2017 Allergies as of 8/30/2017  Review Complete On: 5/31/2017 By: Cholo Bedolla MD  
 No Known Allergies Current Immunizations  Never Reviewed No immunizations on file. Not reviewed this visit You Were Diagnosed With   
  
 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  
  
   
 This list is accurate as of: 8/30/17 12:22 PM.  Always use your most recent med list. amLODIPine 10 mg tablet Commonly known as:  [place] Take  by mouth daily. benazepril 20 mg tablet Commonly known as:  LOTENSIN Take 20 mg by mouth daily. cyclobenzaprine 10 mg tablet Commonly known as:  FLEXERIL Take 1 Tab by mouth three (3) times daily as needed for Muscle Spasm(s). ergocalciferol 50,000 unit capsule Commonly known as:  VITAMIN D2 Take 1 Cap by mouth every seven (7) days. Indications: VITAMIN D DEFICIENCY  
  
 ferrous sulfate 325 mg (65 mg iron) tablet Take 1 Tab by mouth Daily (before breakfast). gabapentin 300 mg capsule Commonly known as:  NEURONTIN  
  
 iron polysaccharides 150 mg iron capsule Commonly known as:  FERREX 150 Take 1 Cap by mouth two (2) times a day.  
  
 naloxone 2 mg/actuation Spry Commonly known as:  [employer] Use 1 spray intranasally into 1 nostril. Use a new Narcan nasal spray for subsequent doses and administer into alternating nostrils. May repeat every 2 to 3 minutes as needed. Indications: OPIATE-INDUCED RESPIRATORY DEPRESSION, OPIOID TOXICITY  
  
 oxyCODONE-acetaminophen  mg per tablet Commonly known as:  PERCOCET Take 1 Tab by mouth every six (6) hours as needed for Pain. Max Daily Amount: 4 Tabs. pantoprazole 40 mg tablet Commonly known as:  PROTONIX Take 1 Tab by mouth daily. pravastatin 20 mg tablet Commonly known as:  PRAVACHOL Take 20 mg by mouth nightly. PROAIR HFA 90 mcg/actuation inhaler Generic drug:  albuterol Prescriptions Printed Refills  
 oxyCODONE-acetaminophen (PERCOCET)  mg per tablet 0 Sig: Take 1 Tab by mouth every six (6) hours as needed for Pain. Max Daily Amount: 4 Tabs. Class: Print Route: Oral  
  
We Performed the Following COMPLETE CBC & AUTO DIFF WBC [53154 CPT(R)] FERRITIN [17733 CPT(R)] IRON PROFILE [id number] CPT(R)] METABOLIC PANEL, COMPREHENSIVE [94860 CPT(R)] VITAMIN D, 25 HYDROXY [id number] CPT(R)] Follow-up Instructions Return in about 3 months (around 11/30/2017). To-Do List   
 08/30/2017 Lab:  CBC WITH 3 PART DIFF Introducing \[facility] [place] [facility]\"" & [facility] SERVICES! Ember Harris introduces [employer] patient portal. Now you can access parts of your medical record, email your doctor's office, and request medication refills online. 1. In your internet browser, go to [email or web address] 2. Click on the First Time User? Click Here link in the Sign In box. You will see the New Member Sign Up page. 3. Enter your [employer] Access Code exactly as it appears below. You will not need to use this code after youve completed the sign-up process. If you do not sign up before the expiration date, you must request a new code. · [employer] Access Code: [email or web address] Expires: 11/28/2017 10:56 AM 
 
4. Enter the last four digits of your Social Security Number (xxxx) and Date of Birth (mm/dd/yyyy) as indicated and click Submit. You will be taken to the next sign-up page. 5. Create a [employer] ID. This will be your [employer] login ID and cannot be changed, so think of one that is secure and easy to remember. 6. Create a [employer] password. You can change your password at any time. 7. Enter your Password Reset Question and Answer. This can be used at a later time if you forget your password. 8. Enter your e-mail address. You will receive e-mail notification when new information is available in [address] 9. Click Sign Up. You can now view and download portions of your medical record. 10. Click the Download Summary menu link to download a portable copy of your medical information. If you have questions, please visit the Frequently Asked Questions section of the [employer] website. Remember, [employer] is NOT to be used for urgent needs. For medical emergencies, dial 911. Now available from your iPhone and Android! Please provide this summary of care documentation to your next provider. Your primary care clinician is listed as Salvador Gandhi. If you have any questions after today's visit, please call [phone].

## 2017-08-31 LAB
25(OH)D3+25(OH)D2 SERPL-MCNC: 21.2 NG/ML (ref 30–100)
ALBUMIN SERPL-MCNC: 4.5 G/DL (ref 3.6–4.8)
ALBUMIN/GLOB SERPL: 1.7 {RATIO} (ref 1.2–2.2)
ALP SERPL-CCNC: 72 IU/L (ref 39–117)
ALT SERPL-CCNC: 14 IU/L (ref 0–32)
AST SERPL-CCNC: 16 IU/L (ref 0–40)
BILIRUB SERPL-MCNC: <0.2 MG/DL (ref 0–1.2)
BUN SERPL-MCNC: 10 MG/DL (ref 8–27)
BUN/CREAT SERPL: 19 (ref 12–28)
CALCIUM SERPL-MCNC: 9.7 MG/DL (ref 8.7–10.3)
CHLORIDE SERPL-SCNC: 108 MMOL/L (ref 96–106)
CO2 SERPL-SCNC: 21 MMOL/L (ref 18–29)
CREAT SERPL-MCNC: 0.52 MG/DL (ref 0.57–1)
FERRITIN SERPL-MCNC: 98 NG/ML (ref 15–150)
GLOBULIN SER CALC-MCNC: 2.7 G/DL (ref 1.5–4.5)
GLUCOSE SERPL-MCNC: 97 MG/DL (ref 65–99)
IRON SATN MFR SERPL: 19 % (ref 15–55)
IRON SERPL-MCNC: 49 UG/DL (ref 27–139)
POTASSIUM SERPL-SCNC: 4.2 MMOL/L (ref 3.5–5.2)
PROT SERPL-MCNC: 7.2 G/DL (ref 6–8.5)
SODIUM SERPL-SCNC: 145 MMOL/L (ref 134–144)
TIBC SERPL-MCNC: 264 UG/DL (ref 250–450)
UIBC SERPL-MCNC: 215 UG/DL (ref 118–369)

## 2017-09-27 DIAGNOSIS — D46.9 MYELODYSPLASTIC SYNDROME (HCC): ICD-10-CM

## 2017-09-28 DIAGNOSIS — D46.9 MYELODYSPLASTIC SYNDROME (HCC): ICD-10-CM

## 2017-09-28 RX ORDER — OXYCODONE AND ACETAMINOPHEN 10; 325 MG/1; MG/1
1 TABLET ORAL
Qty: 120 TAB | Refills: 0 | Status: SHIPPED | OUTPATIENT
Start: 2017-09-28 | End: 2017-09-28 | Stop reason: SDUPTHER

## 2017-09-28 RX ORDER — OXYCODONE AND ACETAMINOPHEN 10; 325 MG/1; MG/1
1 TABLET ORAL
Qty: 120 TAB | Refills: 0 | Status: SHIPPED | OUTPATIENT
Start: 2017-09-28 | End: 2017-10-26 | Stop reason: SDUPTHER

## 2017-11-29 ENCOUNTER — OFFICE VISIT (OUTPATIENT)
Dept: ONCOLOGY | Age: 67
End: 2017-11-29

## 2017-11-29 ENCOUNTER — HOSPITAL ENCOUNTER (OUTPATIENT)
Dept: ONCOLOGY | Age: 67
Discharge: HOME | End: 2017-11-29

## 2017-11-29 VITALS
HEART RATE: 88 BPM | WEIGHT: 198.2 LBS | TEMPERATURE: 97.8 F | SYSTOLIC BLOOD PRESSURE: 159 MMHG | DIASTOLIC BLOOD PRESSURE: 83 MMHG | BODY MASS INDEX: 30.14 KG/M2

## 2017-11-29 DIAGNOSIS — D46.4 REFRACTORY ANEMIA DUE TO MYELODYSPLASTIC SYNDROME (HCC): ICD-10-CM

## 2017-11-29 DIAGNOSIS — D46.9 MYELODYSPLASTIC SYNDROME (HCC): Primary | ICD-10-CM

## 2017-11-29 DIAGNOSIS — E55.9 VITAMIN D DEFICIENCY: ICD-10-CM

## 2017-11-29 DIAGNOSIS — D46.9 MYELODYSPLASTIC SYNDROME (HCC): ICD-10-CM

## 2017-11-29 DIAGNOSIS — M19.90 ARTHRITIS: ICD-10-CM

## 2017-11-29 LAB
BASO+EOS+MONOS # BLD AUTO: 0.3 K/UL (ref 0–2.3)
BASO+EOS+MONOS # BLD AUTO: 5 % (ref 0.1–17)
DIFFERENTIAL METHOD BLD: ABNORMAL
ERYTHROCYTE [DISTWIDTH] IN BLOOD BY AUTOMATED COUNT: 14.6 % (ref 11.5–14.5)
HCT VFR BLD AUTO: 37.4 % (ref 36–48)
HGB BLD-MCNC: 12.9 G/DL (ref 12–16)
LYMPHOCYTES # BLD: 1.3 K/UL (ref 1.1–5.9)
LYMPHOCYTES NFR BLD: 26 % (ref 14–44)
MCH RBC QN AUTO: 33.9 PG (ref 25–35)
MCHC RBC AUTO-ENTMCNC: 34.5 G/DL (ref 31–37)
MCV RBC AUTO: 98.4 FL (ref 78–102)
NEUTS SEG # BLD: 3.6 K/UL (ref 1.8–9.5)
NEUTS SEG NFR BLD: 69 % (ref 40–70)
PLATELET # BLD AUTO: 281 K/UL (ref 140–440)
RBC # BLD AUTO: 3.8 M/UL (ref 4.1–5.1)
WBC # BLD AUTO: 5.2 K/UL (ref 4.5–13)

## 2017-11-29 RX ORDER — OXYCODONE AND ACETAMINOPHEN 10; 325 MG/1; MG/1
1 TABLET ORAL
Qty: 120 TAB | Refills: 0 | Status: SHIPPED | OUTPATIENT
Start: 2017-11-29 | End: 2017-12-26 | Stop reason: SDUPTHER

## 2017-11-29 RX ORDER — ERGOCALCIFEROL 1.25 MG/1
50000 CAPSULE ORAL
Qty: 12 CAP | Refills: 2 | Status: SHIPPED | OUTPATIENT
Start: 2017-11-29 | End: 2019-01-14 | Stop reason: SDUPTHER

## 2017-11-29 NOTE — PROGRESS NOTES
Hematology/Oncology  Progress Note    Name: Ashwin Rasheed  Date: 2017  : 1950    PCP:Khadra Fam MD      Ms. Simona Collins is a 69year old female who was seen for management of her myelodysplastic syndrome/refractory anemia. The patient also has quite severe arthritis. Current therapy: Percocet 10 mg 1 tablet every 6 hours as needed for pain control    Subjective:     Ms. Simona Collins is a 68-year-old  woman who has myelodysplastic syndrome with refractory anemia. She also suffers from quite severe arthritis involving her neck, back, wrist and knees. She is ambulating with the use of a cane at this time. She is treated with Steroids by her rheumatologist and she also uses Percocet as needed. Today she is complaining of severe arthritic discomfort and pain in her neck. She is being treated with Prednisone in addition to the pain medication which is providing some relief. Her appetite and energy level are good. She denies fevers, chest pains, or shortness of breath. She has no additional complaints. The patient is requesting a renewal of her Percocet prescription and iron pills today. Currently she is ambulating with the use of a cane. She states that she has had surgery on her right hand and is continuing to undergo physical therapy. Her range of motion of her fingers is getting better and she is able to  objects a little easier. Past medical history, family history, and social history: these were reviewed and remains unchanged.       Past Medical History:   Diagnosis Date    Chronic pain     Chronic leg pain    Hypercholesterolemia     Hypertension     Myelodysplastic syndrome, unspecified ([address]     Refractory Anemia      Past Surgical History:   Procedure Laterality Date   [facility] SURGERY  [id number]    Work related back injury     Social History     Social History    Marital status: UNKNOWN     Spouse name: N/A    Number of children: N/A    Years of education: N/A Occupational History    Not on file. Social History Main Topics    Smoking status: Current Every Day Smoker     Packs/day: 1.50     Types: Cigarettes    Smokeless tobacco: Not on file    Alcohol use Yes      Comment: Occasional - beer    Drug use: Not on file    Sexual activity: Not on file     Other Topics Concern    Not on file     Social History Narrative     No family history on file. Current Outpatient Prescriptions   Medication Sig Dispense Refill    oxyCODONE-acetaminophen (PERCOCET)  mg per tablet Take 1 Tab by mouth every six (6) hours as needed for Pain. Max Daily Amount: 4 Tabs. 120 Tab 0    ergocalciferol (VITAMIN D2) 50,000 unit capsule Take 1 Cap by mouth every seven (7) days. Indications: Vitamin D Deficiency 12 Cap 2    naloxone (NARCAN) 2 mg/actuation spry Use 1 spray intranasally into 1 nostril. Use a new Narcan nasal spray for subsequent doses and administer into alternating nostrils. May repeat every 2 to 3 minutes as needed. Indications: OPIATE-INDUCED RESPIRATORY DEPRESSION, OPIOID TOXICITY 1 Box 0    iron polysaccharides (FERREX 150) 150 mg iron capsule Take 1 Cap by mouth two (2) times a day. 90 Cap 6    ferrous sulfate 325 mg (65 mg iron) tablet Take 1 Tab by mouth Daily (before breakfast). 90 Tab 3    pantoprazole (PROTONIX) 40 mg tablet Take 1 Tab by mouth daily. 30 Tab 1    PROAIR HFA 90 mcg/actuation inhaler       gabapentin (NEURONTIN) 300 mg capsule       cyclobenzaprine (FLEXERIL) 10 mg tablet Take 1 Tab by mouth three (3) times daily as needed for Muscle Spasm(s). 90 Tab 6    benazepril (LOTENSIN) 20 mg tablet Take 20 mg by mouth daily.  pravastatin (PRAVACHOL) 20 mg tablet Take 20 mg by mouth nightly.  amLODIPine (NORVASC) 10 mg tablet Take  by mouth daily. Review of Systems  Constitutional: The patient has complains of pain in the neck due to severe arthritis.   HEENT: The patient denies recent head trauma, eye pain, blurred vision, hearing deficit, oropharyngeal mucosal pain or lesions, and the patient denies throat pain or discomfort. Lymphatics: The patient denies palpable peripheral lymphadenopathy. Hematologic: The patient denies having bruising, bleeding, or progressive fatigue. Respiratory: Patient denies having shortness of breath, cough, sputum production, fever, or dyspnea on exertion. Cardiovascular: The patient denies having leg pain, leg swelling, heart palpitations, chest permit, chest pain, or lightheadedness. The patient denies having dyspnea on exertion. Gastrointestinal: The patient denies having nausea, emesis, or diarrhea. The patient denies having any hematemesis or blood in the stool. Genitourinary: Patient denies having urinary urgency, frequency, or dysuria. The patient denies having blood in the urine. Psychological: The patient denies having symptoms of nervousness, anxiety, depression, or thoughts of harming himself some of this. Skin: Patient denies having skin rashes, skin, ulcerations, or unexplained itching or pruritus. Musculoskeletal: The patient complains of pain in neck      Objective:     Visit Vitals    /83 (BP 1 Location: Right arm, BP Patient Position: Sitting)    Pulse 88    Temp 97.8 °F (36.6 °C) (Oral)    Wt 89.9 kg (198 lb 3.2 oz)    BMI 30.14 kg/m2     Pain 6/10 ECOG 0  Physical Exam:   Gen. Appearance: The patient is in no acute distress. Skin: There is no bruise or rash. HEENT: The exam is unremarkable. Neck: Supple without lymphadenopathy or thyromegaly. Lungs: Clear to auscultation and percussion; there are no wheezes or rhonchi. Heart: Regular rate and rhythm; there are no murmurs, gallops, or rubs. Abdomen: Bowel sounds are present and normal.  There is no guarding, tenderness, or hepatosplenomegaly. Extremities: There is no clubbing, cyanosis, or edema. Neurologic: There are no focal neurologic deficits. Lymphatics:  There is no palpable peripheral lymphadenopathy. Musculoskeletal: The patient has full range of motion at all joints. The patient has some deformity in her knee joints bilaterally. She is using a cane for mobility support. Psychological/psychiatric: There is no clinical evidence of anxiety, depression, or melancholy. Lab data:      Results for orders placed or performed during the hospital encounter of 11/29/17   CBC WITH 3 PART DIFF     Status: Abnormal   Result Value Ref Range Status    WBC 5.2 4.5 - 13.0 K/uL Final    RBC 3.80 (L) 4.10 - 5.10 M/uL Final    HGB 12.9 12.0 - 16 g/dL Final    HCT 37.4 36 - 48 % Final    MCV 98.4 78 - 102 FL Final    MCH 33.9 25.0 - 35.0 PG Final    MCHC 34.5 31 - 37 g/dL Final    RDW 14.6 (H) 11.5 - 14.5 % Final    PLATELET 804 796 - 548 K/uL Final    NEUTROPHILS 69 40 - 70 % Final    MIXED CELLS 5 0.1 - 17 % Final    LYMPHOCYTES 26 14 - 44 % Final    ABS. NEUTROPHILS 3.6 1.8 - 9.5 K/UL Final    ABS. MIXED CELLS 0.3 0.0 - 2.3 K/uL Final    ABS. LYMPHOCYTES 1.3 1.1 - 5.9 K/UL Final     Comment: Test performed at [address]. Results Reviewed by Medical Director. DF AUTOMATED   Final           Assessment:     1. Myelodysplastic syndrome ([facility]    2. 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 Vitamin D deficiency    4. Arthritis      Plan:     Myelodysplastic syndrome/refractory anemia: I have explained to the patient  that the CBC today revealed that her current hemoglobin is 12.9 g/dL with hematocrit of 37.4%. We will continue to monitor her at 3 month intervals with the option of providing Procrit if she ever declines below 10 g on a hemoglobin below 30% on the hematocrit. Arthritis(progressive problem): The patient is complaining of progressive pain in the neck. Also back and right wrist and hand. She continues to use a narcotic based pain medication in addition to Prednisone for pain control. I will renew her Percocet Rx today.   Generally, she receives Percocet 10 mg every 6-8 hours as needed. Vitamin D Deficiency: I have explained to the patient and her vitamin D level on 8/30/2017 was 21.2 ng/mL. At this time we will restart vitamin D 50,000 units weekly for 12 weeks. I have sent a new prescription for vitamin D to her pharmacy. I will see her back in clinic in 12 weeks to see how well she is doing. Follow-up will occur in 3 months for a complete reassessment. Orders Placed This Encounter    COMPLETE CBC & AUTO DIFF WBC    InHouse CBC ([employer])     Standing Status:   Future     Number of Occurrences:   1     Standing Expiration Date:   00/6/2295    METABOLIC PANEL, COMPREHENSIVE     Standing Status:   Future     Standing Expiration Date:   11/30/2018    IRON PROFILE     Standing Status:   Future     Standing Expiration Date:   11/30/2018    FERRITIN     Standing Status:   Future     Standing Expiration Date:   11/30/2018    VITAMIN D, 25 HYDROXY     Standing Status:   Future     Standing Expiration Date:   11/30/2018    oxyCODONE-acetaminophen (PERCOCET)  mg per tablet     Sig: Take 1 Tab by mouth every six (6) hours as needed for Pain. Max Daily Amount: 4 Tabs. Dispense:  120 Tab     Refill:  0    ergocalciferol (VITAMIN D2) 50,000 unit capsule     Sig: Take 1 Cap by mouth every seven (7) days. Indications: Vitamin D Deficiency     Dispense:  12 Cap     Refill:  2         Que Luna MD  11/29/2017

## 2017-11-29 NOTE — MR AVS SNAPSHOT
Visit Information Date & Time Provider Department Dept. Phone Encounter #  
 11/29/2017 10:00 AM Ana Lilia Driver MD [facility] Oncology [phone] [id number] Follow-up Instructions Return in about 3 months (around 2/28/2018). Your Appointments 2/28/2018 10:30 AM  
Office Visit with Ana Lilia Driver MD  
[facility] [facility]  Oncology [facility]) Appt Note: [address], [address], [address] [place] [address] Upcoming Health Maintenance Date Due Hepatitis C Screening 1950 DTaP/Tdap/Td series (1 - Tdap) 11/19/1969 BREAST CANCER SCRN MAMMOGRAM 11/19/1998 FOBT Q 1 YEAR AGE 50-75 11/19/1998 ZOSTER VACCINE AGE 60> 9/19/2008 GLAUCOMA SCREENING Q2Y 11/19/2013 OSTEOPOROSIS SCREENING (DEXA) 11/19/2013 Pneumococcal 65+ High/Highest Risk (1 of 2 - PCV13) 11/19/2013 MEDICARE YEARLY EXAM 11/19/2013 Influenza Age 5 to Adult 8/1/2017 Allergies as of 11/29/2017  Review Complete On: 5/31/2017 By: Ana Lilia Driver MD  
 No Known Allergies Current Immunizations  Never Reviewed No immunizations on file. Not reviewed this visit You Were Diagnosed With   
  
 Codes Comments Myelodysplastic syndrome (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  
  
   
This list is accurate as of: 11/29/17 10:52 AM.  Always use your most recent med list. amLODIPine 10 mg tablet Commonly known as:  Arlyss Crimora Take  by mouth daily. benazepril 20 mg tablet Commonly known as:  LOTENSIN Take 20 mg by mouth daily. cyclobenzaprine 10 mg tablet Commonly known as:  FLEXERIL Take 1 Tab by mouth three (3) times daily as needed for Muscle Spasm(s). ergocalciferol 50,000 unit capsule Commonly known as:  VITAMIN D2 Take 1 Cap by mouth every seven (7) days. Indications: Vitamin D Deficiency  
  
 ferrous sulfate 325 mg (65 mg iron) tablet Take 1 Tab by mouth Daily (before breakfast). gabapentin 300 mg capsule Commonly known as:  NEURONTIN  
  
 iron polysaccharides 150 mg iron capsule Commonly known as:  FERREX 150 Take 1 Cap by mouth two (2) times a day.  
  
 naloxone 2 mg/actuation Spry Commonly known as:  [employer] Use 1 spray intranasally into 1 nostril. Use a new Narcan nasal spray for subsequent doses and administer into alternating nostrils. May repeat every 2 to 3 minutes as needed. Indications: OPIATE-INDUCED RESPIRATORY DEPRESSION, OPIOID TOXICITY  
  
 oxyCODONE-acetaminophen  mg per tablet Commonly known as:  PERCOCET Take 1 Tab by mouth every six (6) hours as needed for Pain. Max Daily Amount: 4 Tabs. pantoprazole 40 mg tablet Commonly known as:  PROTONIX Take 1 Tab by mouth daily. pravastatin 20 mg tablet Commonly known as:  PRAVACHOL Take 20 mg by mouth nightly. PROAIR HFA 90 mcg/actuation inhaler Generic drug:  albuterol Prescriptions Printed  Refills  
 oxyCODONE-acetaminophen (PERCOCET)  mg per tablet 0  
 Sig: Take 1 Tab by mouth every six (6) hours as needed for Pain. Max Daily Amount: 4 Tabs. Class: Print Route: Oral  
  
Prescriptions Sent to Pharmacy Refills  
 ergocalciferol (VITAMIN D2) 50,000 unit capsule 2 Sig: Take 1 Cap by mouth every seven (7) days. Indications: Vitamin D Deficiency Class: Normal  
 Pharmacy: [id number], [facility] #: [phone] Route: Oral  
  
We Performed the Following COMPLETE CBC & AUTO DIFF WBC [65438 CPT(R)] Follow-up Instructions Return in about 3 months (around 2/28/2018). To-Do List   
 11/29/2017 Lab:  CBC WITH 3 PART DIFF   
  
 11/29/2017 Lab:  FERRITIN   
  
 11/29/2017 Lab:  IRON PROFILE   
  
 11/29/2017 Lab:  METABOLIC PANEL, COMPREHENSIVE   
  
 11/29/2017 Lab:  VITAMIN D, 25 HYDROXY Patient Instructions Myelodysplastic Syndromes: Care Instructions Your Care Instructions Myelodysplastic syndromes, also called MDS, are a group of rare conditions in which the bone marrow does not make enough healthy blood cells. Normally, the bone marrow makes red blood cells, white blood cells, and platelets. These cells carry oxygen in the blood, help the body fight infections, and help the blood clot. With MDS, you may feel weak and tired, get infections often, and bruise easily, although symptoms tend to vary. MDS is a form of blood cancer. In some cases, MDS can turn into acute myeloid leukemia (AML), another type of cancer. Some people develop MDS after treatment for cancer or exposure to pesticides or other chemicals. But in most cases, the cause of MDS is not known. Your doctor will use the results of blood tests to guide your treatment. There are many types of MDS, with different treatment plans for each.  If you have enough red blood cells and are feeling all right, you may not need active treatment, but you and your doctor will want to watch your condition carefully. If you start feeling lightheaded and have no energy, you may need a blood transfusion. Your doctor also may give you antibiotics to prevent or treat infection. Follow-up care is a key part of your treatment and safety. Be sure to make and go to all appointments, and call your doctor if you are having problems. It's also a good idea to know your test results and keep a list of the medicines you take. How can you care for yourself at home? · Take your medicines exactly as prescribed. Call your doctor if you think you are having a problem with your medicine. You will get more details on the specific medicines your doctor prescribes. · If your doctor prescribed antibiotics, take them as directed. Do not stop taking them just because you feel better. You need to take the full course of antibiotics. If you have side effects from antibiotics, tell your doctor. · Take steps to control your stress and workload. Learn relaxation techniques. ¨ Share your feelings. Stress and tension affect our emotions. By expressing your feelings to others, you may be able to understand and cope with them. ¨ Consider joining a support group. Talking about a problem with your spouse, a good friend, or other people with similar problems is a good way to reduce tension and stress. ¨ Express yourself with art. Try writing, crafts, dance, or art to relieve stress. ¨ Be kind to your body and mind. Getting enough sleep, eating a healthy diet, and taking time to do things you enjoy can contribute to an overall feeling of balance in your life and help reduce stress. ¨ Get help if you need it. Discuss your concerns with your doctor or counselor. · Do not smoke. Smoking can make blood problems worse. If you need help quitting, talk to your doctor about stop-smoking programs and medicines. These can increase your chances of quitting for good. · If you have not already done so, prepare a list of advance directives. Advance directives are instructions to your doctor and family members about what kind of care you want if you become unable to speak or express yourself. · Call the [facility] ([phone]) or visit its website at [address] [employer] for more information. When should you call for help? Call 911 anytime you think you may need emergency care. For example, call if: 
? · You passed out (lost consciousness). ?Call your doctor now or seek immediate medical care if: 
? · You have a fever. ? · You have abnormal bleeding. ? · You have new or worse pain. ? · You think you have an infection. ? · You have new symptoms, such as a cough, belly pain, vomiting, diarrhea, or a rash. ? Watch closely for changes in your health, and be sure to contact your doctor if: 
? · You are much more tired than usual.  
? · You have swollen glands in your armpits, groin, or neck. ? · You do not get better as expected. Where can you learn more? Go to http://marjorie-marianela.info/. Enter Nithya Silva in the search box to learn more about \"Myelodysplastic Syndromes: Care Instructions. \" Current as of: May 12, 2017 Content Version: 11.4 © 0354-2089 Standardized Safety. Care instructions adapted under license by [employer] (which disclaims liability or warranty for this information). If you have questions about a medical condition or this instruction, always ask your healthcare professional. [place] [address] any warranty or liability for your use of this information. Introducing [facility]! Denny Luna introduces HouseTab patient portal. Now you can access parts of your medical record, email your doctor's office, and request medication refills online. 1. In your internet browser, go to [email or web address] 2. Click on the First Time User? Click Here link in the Sign In box. You will see the New Member Sign Up page. 3. Enter your Insticator Access Code exactly as it appears below. You will not need to use this code after youve completed the sign-up process. If you do not sign up before the expiration date, you must request a new code. · Insticator Access Code: [email or web address] Expires: 2/27/2018  9:59 AM 
 
4. Enter the last four digits of your Social Security Number (xxxx) and Date of Birth (mm/dd/yyyy) as indicated and click Submit. You will be taken to the next sign-up page. 5. Create a Insticator ID. This will be your Insticator login ID and cannot be changed, so think of one that is secure and easy to remember. 6. Create a Insticator password. You can change your password at any time. 7. Enter your Password Reset Question and Answer. This can be used at a later time if you forget your password. 8. Enter your e-mail address. You will receive e-mail notification when new information is available in [address]. 9. Click Sign Up. You can now view and download portions of your medical record. 10. Click the Download Summary menu link to download a portable copy of your medical information. If you have questions, please visit the Frequently Asked Questions section of the Insticator website. Remember, Insticator is NOT to be used for urgent needs. For medical emergencies, dial 911. Now available from your iPhone and Android! Please provide this summary of care documentation to your next provider. Your primary care clinician is listed as Vinny Amaya. If you have any questions after today's visit, please call [phone].

## 2017-11-29 NOTE — PATIENT INSTRUCTIONS
Myelodysplastic Syndromes: Care Instructions  Your Care Instructions  Myelodysplastic syndromes, also called MDS, are a group of rare conditions in which the bone marrow does not make enough healthy blood cells. Normally, the bone marrow makes red blood cells, white blood cells, and platelets. These cells carry oxygen in the blood, help the body fight infections, and help the blood clot. With MDS, you may feel weak and tired, get infections often, and bruise easily, although symptoms tend to vary. MDS is a form of blood cancer. In some cases, MDS can turn into acute myeloid leukemia (AML), another type of cancer. Some people develop MDS after treatment for cancer or exposure to pesticides or other chemicals. But in most cases, the cause of MDS is not known. Your doctor will use the results of blood tests to guide your treatment. There are many types of MDS, with different treatment plans for each. If you have enough red blood cells and are feeling all right, you may not need active treatment, but you and your doctor will want to watch your condition carefully. If you start feeling lightheaded and have no energy, you may need a blood transfusion. Your doctor also may give you antibiotics to prevent or treat infection. Follow-up care is a key part of your treatment and safety. Be sure to make and go to all appointments, and call your doctor if you are having problems. It's also a good idea to know your test results and keep a list of the medicines you take. How can you care for yourself at home? · Take your medicines exactly as prescribed. Call your doctor if you think you are having a problem with your medicine. You will get more details on the specific medicines your doctor prescribes. · If your doctor prescribed antibiotics, take them as directed. Do not stop taking them just because you feel better. You need to take the full course of antibiotics.  If you have side effects from antibiotics, tell your doctor. · Take steps to control your stress and workload. Learn relaxation techniques. ¨ Share your feelings. Stress and tension affect our emotions. By expressing your feelings to others, you may be able to understand and cope with them. ¨ Consider joining a support group. Talking about a problem with your spouse, a good friend, or other people with similar problems is a good way to reduce tension and stress. ¨ Express yourself with art. Try writing, crafts, dance, or art to relieve stress. ¨ Be kind to your body and mind. Getting enough sleep, eating a healthy diet, and taking time to do things you enjoy can contribute to an overall feeling of balance in your life and help reduce stress. ¨ Get help if you need it. Discuss your concerns with your doctor or counselor. · Do not smoke. Smoking can make blood problems worse. If you need help quitting, talk to your doctor about stop-smoking programs and medicines. These can increase your chances of quitting for good. · If you have not already done so, prepare a list of advance directives. Advance directives are instructions to your doctor and family members about what kind of care you want if you become unable to speak or express yourself. · Call the [employer] ([phone]) or visit its website at [address] for more information. When should you call for help? Call 911 anytime you think you may need emergency care. For example, call if:  ? · You passed out (lost consciousness). ?Call your doctor now or seek immediate medical care if:  ? · You have a fever. ? · You have abnormal bleeding. ? · You have new or worse pain. ? · You think you have an infection. ? · You have new symptoms, such as a cough, belly pain, vomiting, diarrhea, or a rash. ? Watch closely for changes in your health, and be sure to contact your doctor if:  ? · You are much more tired than usual.   ? · You have swollen glands in your armpits, groin, or neck.    ? · You do not get better as expected. Where can you learn more? Go to http://marjorie-marianela.info/. Enter Shreyas Nash in the search box to learn more about \"Myelodysplastic Syndromes: Care Instructions. \"  Current as of: May 12, 2017  Content Version: 11.4  © 2528-9549 [employer]. Care instructions adapted under license by [employer] (which disclaims liability or warranty for this information). If you have questions about a medical condition or this instruction, always ask your healthcare professional. [address] any warranty or liability for your use of this information.

## 2017-11-30 LAB
25(OH)D3+25(OH)D2 SERPL-MCNC: 27.1 NG/ML (ref 30–100)
ALBUMIN SERPL-MCNC: 4.9 G/DL (ref 3.6–4.8)
ALBUMIN/GLOB SERPL: 1.8 {RATIO} (ref 1.2–2.2)
ALP SERPL-CCNC: 74 IU/L (ref 39–117)
ALT SERPL-CCNC: 12 IU/L (ref 0–32)
AST SERPL-CCNC: 14 IU/L (ref 0–40)
BILIRUB SERPL-MCNC: <0.2 MG/DL (ref 0–1.2)
BUN SERPL-MCNC: 7 MG/DL (ref 8–27)
BUN/CREAT SERPL: 18 (ref 12–28)
CALCIUM SERPL-MCNC: 9.8 MG/DL (ref 8.7–10.3)
CHLORIDE SERPL-SCNC: 102 MMOL/L (ref 96–106)
CO2 SERPL-SCNC: 24 MMOL/L (ref 18–29)
CREAT SERPL-MCNC: 0.38 MG/DL (ref 0.57–1)
FERRITIN SERPL-MCNC: 108 NG/ML (ref 15–150)
GFR SERPLBLD CREATININE-BSD FMLA CKD-EPI: 108 ML/MIN/1.73
GFR SERPLBLD CREATININE-BSD FMLA CKD-EPI: 125 ML/MIN/1.73
GLOBULIN SER CALC-MCNC: 2.8 G/DL (ref 1.5–4.5)
GLUCOSE SERPL-MCNC: 92 MG/DL (ref 65–99)
IRON SATN MFR SERPL: 20 % (ref 15–55)
IRON SERPL-MCNC: 55 UG/DL (ref 27–139)
POTASSIUM SERPL-SCNC: 4.2 MMOL/L (ref 3.5–5.2)
PROT SERPL-MCNC: 7.7 G/DL (ref 6–8.5)
SODIUM SERPL-SCNC: 144 MMOL/L (ref 134–144)
TIBC SERPL-MCNC: 275 UG/DL (ref 250–450)
UIBC SERPL-MCNC: 220 UG/DL (ref 118–369)

## 2017-12-26 DIAGNOSIS — D46.9 MYELODYSPLASTIC SYNDROME (HCC): ICD-10-CM

## 2017-12-26 RX ORDER — OXYCODONE AND ACETAMINOPHEN 10; 325 MG/1; MG/1
1 TABLET ORAL
Qty: 120 TAB | Refills: 0 | Status: SHIPPED | OUTPATIENT
Start: 2017-12-26 | End: 2018-01-23 | Stop reason: SDUPTHER

## 2018-01-23 DIAGNOSIS — D46.9 MYELODYSPLASTIC SYNDROME (HCC): ICD-10-CM

## 2018-01-23 RX ORDER — OXYCODONE AND ACETAMINOPHEN 10; 325 MG/1; MG/1
1 TABLET ORAL
Qty: 120 TAB | Refills: 0 | Status: SHIPPED | OUTPATIENT
Start: 2018-01-23 | End: 2018-02-21 | Stop reason: SDUPTHER

## 2018-02-21 DIAGNOSIS — D46.9 MYELODYSPLASTIC SYNDROME (HCC): ICD-10-CM

## 2018-02-21 RX ORDER — OXYCODONE AND ACETAMINOPHEN 10; 325 MG/1; MG/1
1 TABLET ORAL
Qty: 120 TAB | Refills: 0 | Status: SHIPPED | OUTPATIENT
Start: 2018-02-21 | End: 2018-02-22 | Stop reason: SDUPTHER

## 2018-02-22 DIAGNOSIS — D46.9 MYELODYSPLASTIC SYNDROME (HCC): ICD-10-CM

## 2018-02-22 RX ORDER — OXYCODONE AND ACETAMINOPHEN 10; 325 MG/1; MG/1
1 TABLET ORAL
Qty: 120 TAB | Refills: 0 | Status: SHIPPED | OUTPATIENT
Start: 2018-02-22 | End: 2018-03-26 | Stop reason: SDUPTHER

## 2018-03-19 NOTE — PROGRESS NOTES
Hematology/Oncology  Progress Note    Name: Guanakito Subramanian  Date: 2017  : 1950    PCP:Khadra So MD      Ms. Tank Donis is a 74year old female who was seen for management of her myelodysplastic syndrome/refractory anemia. The patient also has quite severe arthritis. Current therapy: Percocet 10 mg 1 tablet every 4-6 hours as needed for pain control    Subjective:     Ms. Tank Donis is a 67-year-old  woman who has myelodysplastic syndrome with refractory anemia. She also suffers from quite severe arthritis involving her back, wrist and knees. She is ambulating with the use of a cane at this time. She is treated with Steroids from her rheumatologist and she also uses Percocet as needed. Her stomach pains are much improved with the use of Prilosec 40mg daily. Today she is complaining of severe arthritic discomfort and pain in her hands and arms. She is being treated with Prednisone in addtion to the pain medication which is providing some relief. Her appetite and energy level are good. She denies fevers, chest pains, or shortness of breath. She has no additional complaints. The patient is requesting a renewal of her Percocet prescription and iron pills today. Past medical history, family history, and social history: these were reviewed and remains unchanged. Past Medical History   Diagnosis Date    Chronic pain      Chronic leg pain    Hypercholesterolemia     Hypertension     Myelodysplastic syndrome, unspecified ([facility] [address]     Refractory Anemia      Past Surgical History   Procedure Laterality Date    Hx back surgery  2017,7672     Work related back injury     Social History     Social History    Marital status: UNKNOWN     Spouse name: N/A    Number of children: N/A    Years of education: N/A     Occupational History    Not on file.      Social History Main Topics    Smoking status: Current Every Day Smoker     Packs/day: 1.50     Types: Cigarettes    Smokeless tobacco: Not on file  Alcohol use Yes      Comment: Occasional - beer    Drug use: Not on file    Sexual activity: Not on file     Other Topics Concern    Not on file     Social History Narrative     No family history on file. Current Outpatient Prescriptions   Medication Sig Dispense Refill    oxyCODONE-acetaminophen (PERCOCET)  mg per tablet Take 1 Tab by mouth every four (4) hours as needed for Pain. 180 Tab 0    ferrous sulfate 325 mg (65 mg iron) tablet Take 1 Tab by mouth Daily (before breakfast). 90 Tab 3    pantoprazole (PROTONIX) 40 mg tablet Take 1 Tab by mouth daily. 30 Tab 1    PROAIR HFA 90 mcg/actuation inhaler       ergocalciferol (VITAMIN D2) 50,000 unit capsule Take 1 Cap by mouth every seven (7) days. 8 Cap 0    gabapentin (NEURONTIN) 300 mg capsule       cyclobenzaprine (FLEXERIL) 10 mg tablet Take 1 Tab by mouth three (3) times daily as needed for Muscle Spasm(s). 90 Tab 6    benazepril (LOTENSIN) 20 mg tablet Take 20 mg by mouth daily.  pravastatin (PRAVACHOL) 20 mg tablet Take 20 mg by mouth nightly.  amLODIPine (NORVASC) 10 mg tablet Take  by mouth daily. Review of Systems  Constitutional: The patient has complains of pain in the right hand and wrist pain due to severe arthritis. HEENT: The patient denies recent head trauma, eye pain, blurred vision,  hearing deficit, oropharyngeal mucosal pain or lesions, and the patient denies throat pain or discomfort. Lymphatics: The patient denies palpable peripheral lymphadenopathy. Hematologic: The patient denies having bruising, bleeding, or progressive fatigue. Respiratory: Patient denies having shortness of breath, cough, sputum production, fever, or dyspnea on exertion. Cardiovascular: The patient denies having leg pain, leg swelling, heart palpitations, chest permit, chest pain, or lightheadedness. The patient denies having dyspnea on exertion. Gastrointestinal: The patient denies having nausea, emesis, or diarrhea.  The patient denies having any hematemesis or blood in the stool. Genitourinary: Patient denies having urinary urgency, frequency, or dysuria. The patient denies having blood in the urine. Psychological: The patient denies having symptoms of nervousness, anxiety, depression, or thoughts of harming himself some of this. Skin: Patient denies having skin rashes, skin, ulcerations, or unexplained itching or pruritus. Musculoskeletal: The patient complains of pain in right hand and wrist.      Objective:     Visit Vitals    /79    Pulse 79    Temp 97.9 °F (36.6 °C)    Wt 82.6 kg (182 lb)    BMI 27.67 kg/m2     Pain 6/10 ECOG 0  Physical Exam:   Gen. Appearance: The patient is in no acute distress. Skin: There is no bruise or rash. HEENT: The exam is unremarkable. Neck: Supple without lymphadenopathy or thyromegaly. Lungs: Clear to auscultation and percussion; there are no wheezes or rhonchi. Heart: Regular rate and rhythm; there are no murmurs, gallops, or rubs. Abdomen: Bowel sounds are present and normal.  There is no guarding, tenderness, or hepatosplenomegaly. Extremities: There is no clubbing, cyanosis, or edema. Neurologic: There are no focal neurologic deficits. Lymphatics: There is no palpable peripheral lymphadenopathy. Musculoskeletal: The patient has full range of motion at all joints. The patient has some deformity in her knee joints bilaterally. She is using a cane for mobility support. Psychological/psychiatric: There is no clinical evidence of anxiety, depression, or melancholy.     Lab data:      Results for orders placed or performed during the hospital encounter of 02/06/17   CBC WITH 3 PART DIFF     Status: Abnormal   Result Value Ref Range Status    WBC 5.8 4.5 - 13.0 K/uL Final    RBC 4.08 (L) 4.10 - 5.10 M/uL Final    HGB 11.5 (L) 12.0 - 16 g/dL Final    HCT 36.2 36 - 48 % Final    MCV 88.7 78 - 102 FL Final    MCH 28.2 25.0 - 35.0 PG Final    MCHC 31.8 31 - 37 g/dL Final RDW 19.0 (H) 11.5 - 14.5 % Final    PLATELET 796 749 - 992 K/uL Final    NEUTROPHILS 76 (H) 40 - 70 % Final    MIXED CELLS 4 0.1 - 17 % Final    LYMPHOCYTES 20 14 - 44 % Final    ABS. NEUTROPHILS 4.4 1.8 - 9.5 K/UL Final    ABS. MIXED CELLS 0.2 0.0 - 2.3 K/uL Final    ABS. LYMPHOCYTES 1.2 1.1 - 5.9 K/UL Final     Comment: Test performed at [place] [address] Location. Results Reviewed by Medical Director. DF AUTOMATED   Final           Assessment:     1. Myelodysplastic syndrome, unspecified ([facility] [address]    2. Arthritis    3. Myelodysplastic syndrome ([facility] [address]      Plan:   Arthritis(progressive problem): The patient is complaining of progressive pain in the right wrist and hand. She continues to use a narcotic based pain medication in addition to Prednisone for pain control. I will renew her Percocet Rx today. Myelodysplastic syndrome/refractory anemia: I have explained to the patient  that the CBC today revealed a H/H of 11.5g/dl and 36.2% . Procrit will be started if hemoglobin declines below 10 g/dL and hematocrit declines below 30%. A ferritin, iron profile, and CMP will also be obtained. Follow-up will occur in 3 months       Orders Placed This Encounter    COMPLETE CBC & AUTO DIFF WBC    InHouse CBC ([facility])     Standing Status:   Future     Number of Occurrences:   1     Standing Expiration Date:   7/08/3837    METABOLIC PANEL, COMPREHENSIVE     Standing Status:   Future     Standing Expiration Date:   2/7/2018    IRON PROFILE     Standing Status:   Future     Standing Expiration Date:   2/7/2018    FERRITIN     Standing Status:   Future     Standing Expiration Date:   2/7/2018    VITAMIN D, 25 HYDROXY     Standing Status:   Future     Standing Expiration Date:   2/7/2017    oxyCODONE-acetaminophen (PERCOCET)  mg per tablet     Sig: Take 1 Tab by mouth every four (4) hours as needed for Pain.      Dispense:  180 Tab     Refill:  0    ferrous sulfate 325 mg (65 mg iron) tablet     Sig: Procedures    CBC Auto Differential     Fast 12 hours     Standing Status:   Future     Standing Expiration Date:   4/19/2018    Comprehensive Metabolic Panel     Fasting 12 hours     Standing Status:   Future     Standing Expiration Date:   4/19/2018    Lipid Panel     Standing Status:   Future     Standing Expiration Date:   4/19/2018     Order Specific Question:   Is Patient Fasting?/# of Hours     Answer:   12    Psa screening     Standing Status:   Future     Standing Expiration Date:   4/19/2018    TSH without Reflex     Fast 12 hours     Standing Status:   Future     Standing Expiration Date:   4/19/2018    CBC Auto Differential     Fast 12 hours     Standing Status:   Future     Standing Expiration Date:   3/19/2019    Comprehensive Metabolic Panel     Fasting 12 hours     Standing Status:   Future     Standing Expiration Date:   3/19/2019    Lipid Panel     Standing Status:   Future     Standing Expiration Date:   3/19/2019     Order Specific Question:   Is Patient Fasting?/# of Hours     Answer:   12    TSH without Reflex     Fast 12 hours     Standing Status:   Future     Standing Expiration Date:   3/19/2019    Psa screening     Standing Status:   Future     Standing Expiration Date:   3/19/2019   [facility] - Griselda Barnes MD     Referral Priority:   Routine     Referral Type:   Consult for Advice and Opinion     Referral Reason:   Specialty Services Required     Referred to Provider:   Layla Chappell MD     Requested Specialty:   Otolaryngology     Number of Visits Requested:   1       Faye Myers MD Take 1 Tab by mouth Daily (before breakfast). Dispense:  90 Tab     Refill:  3       Felipe Doe NP  2/6/2017     I have assessed the patient independently and  agree with the full assessment as outlined.   Jose Cam MD, Noelle Rodriguez

## 2018-03-23 ENCOUNTER — HOSPITAL ENCOUNTER (OUTPATIENT)
Dept: PHYSICAL THERAPY | Age: 68
Discharge: HOME | End: 2018-03-23
Payer: MEDICARE

## 2018-03-23 PROCEDURE — G8979 MOBILITY GOAL STATUS: HCPCS

## 2018-03-23 PROCEDURE — 97530 THERAPEUTIC ACTIVITIES: CPT

## 2018-03-23 PROCEDURE — G8978 MOBILITY CURRENT STATUS: HCPCS

## 2018-03-23 PROCEDURE — G8980 MOBILITY D/C STATUS: HCPCS

## 2018-03-23 PROCEDURE — 97162 PT EVAL MOD COMPLEX 30 MIN: CPT

## 2018-03-23 PROCEDURE — 97116 GAIT TRAINING THERAPY: CPT

## 2018-03-23 PROCEDURE — 97110 THERAPEUTIC EXERCISES: CPT

## 2018-03-23 NOTE — PROGRESS NOTES
[facility]              [address]        [place], [address]   [phone] fax    Plan of Care/ Statement of Necessity for Physical Therapy Services    Patient name: Odell Johnson Start of Care: 3/23/2018   Referral source: Luis Alberto Woods MD : 1950    Medical Diagnosis: Other abnormalities of gait and mobility [R26.89]   Onset Date:Chronic    Treatment Diagnosis: Gait Training   Prior Hospitalization: see medical history Provider#: [id number]   Medications: Verified on Patient summary List    Comorbidities: HTN, Back Surgery x3, Left Foot Drop (secondary to back surgeries), HA, BMI>30   Prior Level of 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 Assessment:    Patient presents to physical therapy for evaluation for stair handrail per PCP request with patient reporting assistance required with stair management with patient with 3 stairs to enter 1-story home without handrail available. Patient with a complicated PMH with patient reporting PSH significant for 3 lumbar surgeries with resultant left foot drop. Patient reports utilization of SPC x3 years with inconsistent utilization of left AFO secondary to foot drop. Patient reports fall history with patient subjectively reporting ambulation tolerance <50 yards and limited community access secondary to poor activity tolerance and diminished endurance. Patient subjectively reports receival of assistance from home care nurse 4 hours/day x6 days per week with assistance required with completion of household and self-care ADLs. Patient lives with family with patient reporting family assistance required with completion of all household ADLs. Observationally patient noted with left foot drop during gait with inconsistent foot clearance resulting in instability with ambulation. Patient demonstrates an unsafe gait speed with gait speed noted to be 0.91 miles/hour with patient demonstrating Patino Balance Score 35/56 (significant increase in falls risk <40/56). Patient as well noted with generalized diminished endurance as demonstrated by the 30-second sit-to-stand test. With assessment of LE strength patient noted with generalized diminished strength bilaterally with no muscular contraction visible with attempted assessment of left ankle dorsiflexion, a finding consistent with subjectively reported foot drop. With completion of activity-specific balance confidence scale patient with score of 40%, representing significantly poor balance self-confidence. Observed stair management (4 stairs -ascent/descent) with use of SPC in comparison to use of SPC and single handrail assist. With use of SPC and single handrail assistance patient noted with ability to complete with a step-to gait pattern independently whereas with SPC alone CGA to minimal assistance required.     Patient would benefit from the installation of a handrail on home entry stairway in order to increase independence with stair management, improve overall community access and decrease falls risk. In case of emergency, handrail placement on entry stairs would ensure patient's ability to safely exit the home. At this time patient declines the receival of physical therapy services. Key Information:    BP: 136/66 mmHg     Gait: With SPC, Decreased L foot clearance with decreased L knee flexion during swing phase of gait     Functional Tests:  1. 2 Minute Walk Test: 160' (SPC)  2. Stairs (4 step ascent/descent with SPC only): CGA required with ascent with minimal assistance with descent for safety (step-to gait pattern)  3. Stairs (4 step ascent/descent with SPC and 1 handrail):  Independent; Step-to gait pattern   4. 30-second-sit-to-stand: 5 repetitions     Patino Balance Test: 35/56     LE MMT      Left Right   Hip Flexion 2+ 4+     Extension NT NT     Abduction 4 4+     ER NT NT     IR NT NT   Knee Flexion 4 5     Extension 4+ 4+   Ankle Dorsiflexion 0 5   *Assessed in supine  *Bilateral hip IR, ER and extension unable to be assessed secondary to back pain     Sensation: Generalized non-dermatome specific hyposensitivity to light touch noted to left LE     Reflexes: [] Not Tested    Left Right   Knee Jerk (L4) 2+ 2+   Ankle Jerk (SI) 0+ 0+      Evaluation Complexity History MEDIUM  Complexity : 1-2 comorbidities / personal factors will impact the outcome/ POC ; Examination MEDIUM Complexity : 3 Standardized tests and measures addressing body structure, function, activity limitation and / or participation in recreation  ;Presentation MEDIUM Complexity : Evolving with changing characteristics  ; Clinical Decision Making MEDIUM Complexity : FOTO score of 26-74  Overall Complexity Rating: MEDIUM  Problem List: pain affecting function, decrease ROM, decrease strength, impaired gait/ balance, decrease ADL/ functional abilitiies, decrease activity tolerance and decrease flexibility/ joint mobility   Treatment Plan may include any combination of the following: Therapeutic exercise, Therapeutic activities, Neuromuscular re-education, Physical agent/modality, Gait/balance training, Manual therapy, Patient education, Self Care training, Functional mobility training, Home safety training and Stair training  Patient / Family readiness to learn indicated by: asking questions, trying to perform skills and interest  Persons(s) to be included in education: patient (P)  Barriers to Learning/Limitations: None  Patient Goal (s): Get a handrail on my stairs to make me safer  Patient Self Reported Health Status: fair  Rehabilitation Potential: fair    Short Term Goals: To be accomplished in 1 treatments:  1.  Patient will demonstrate ability to complete prescribed HEP as indicated and will verbal understanding. Frequency / Duration: Patient to be seen 1 times per week for 1 treatments. Patient/ Caregiver education and instruction: Diagnosis, prognosis, self care, activity modification and exercises   [x]  Plan of care has been reviewed with PTA    G-Codes (GP)  Mobility   Current  CK= 40-59%   Goal  CK= 40-59%  D/C  CK= 40-59%    The severity rating is based on clinical judgment and the FOTO score. Certification Period: 3/23/2018 - 4/22/2018  Milla Solo, PT 3/23/2018 6:48 AM  ________________________________________________________________________    I certify that the above Therapy Services are being furnished while the patient is under my care. I agree with the treatment plan and certify that this therapy is necessary.     [de-identified] Signature:____________________  Date:____________Time: _________    Please sign and return to [facility] [address], [address]   [phone] fax

## 2018-03-23 NOTE — PROGRESS NOTES
PT DAILY TREATMENT NOTE - [facility]     Patient Name: Gabriela Thompson  Date:3/23/2018  : 1950  [x]  Patient  Verified  Payor: Chace Brock / Plan: [address] [place] [address] HMO / Product Type: Managed Care Medicare /    In time:1205  Out time:1255  Total Treatment Time (min): 50  Total Timed Codes (min): 38  1:1 Treatment Time ( [address] only): 50   Visit #: 1 of 1    Treatment Area: Other abnormalities of gait and mobility [R26.89]    Physical Therapy Evaluation  Neurologic    SUBJECTIVE      Any medication changes, allergies to medications, adverse drug reactions, diagnosis change, or new procedure performed?: [x] No    [] Yes (see summary sheet for update)    Subjective functional status/changes:     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, Nurse (4 hours/day, 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), Ambulation tolerance 50 yards with SPC, Shower chair, Seated with donning/doffing clothes  Current Functional Status: Patient reports no recent change in status  Work Hx: Does not work  Living Situation: 1-story home (3 [address] - No handrail), Lives with family (assistance required to ascend/descend)  Comorbidities: HTN, Back Surgery x3, Left Foot Drop (secondary to back surgeries), HA  Medications: Percocet (PRN)    Pain Intensity (0-10, VAS): Current 8, Worst 9, Best 6    Patient Goals: \"Get a handrail on my stairs to make me safer\"    OBJECTIVE EXAMINATION    BP: 136/66 mmHg    Gait: With SPC, Decreased L foot clearance with decreased L knee flexion during swing phase of gait    Functional Tests:  1. 2 Minute Walk Test: 160' (SPC)  2. Stairs (4 step ascent/descent with SPC only): CGA required with ascent with minimal assistance with descent for safety (step-to gait pattern)  3. Stairs (4 step ascent/descent with SPC and 1 handrail):  Independent; Step-to gait pattern   4. 30-second-sit-to-stand: 5 repetitions    Patino Balance Test: 35/56    LE MMT    Left Right   Hip Flexion 2+ 4+    Extension NT NT Abduction 4 4+    ER NT NT    IR NT NT   Knee Flexion 4 5    Extension 4+ 4+   Ankle Dorsiflexion 0 5   *Assessed in supine  *Bilateral hip IR, ER and extension unable to be assessed secondary to back pain    Sensation: Generalized non-dermatome specific hyposensitivity to light touch noted to left LE    Reflexes: [] Not Tested   Left Right   Knee Jerk (L4) 2+ 2+   Ankle Jerk (SI) 0+ 0+     OBJECTIVE     12 min [x]Eval                  []Re-Eval     10 min Therapeutic Exercise:  [x] See flow sheet : Patient educated in completion of prescribed HEP with patient provided with written HEP instructions   Rationale: increase ROM and increase strength to improve the patients ability to improve safety with home management    14 min Therapeutic Activity:  [x]  See flow sheet : Patient provided with modifications and compensations in order to improve safety with household and community ambulation and self-care ADLs   Rationale: increase ROM, increase strength, improve coordination and increase proprioception  to improve the patients ability to improve safety with household ADLs    14 min Gait Trainin. Reviewed proper utilization of SPC and handrail with stair management in order to improve safety and independence with completion   Rationale: Performed in order to improve patient safety with exiting home in case of emergency. With   [] TE   [] TA   [] neuro   [] other: Patient Education: [x] Review HEP    [] Progressed/Changed HEP based on:   [] positioning   [] body mechanics   [] transfers   [] heat/ice application    [] other:      Pain Level (0-10 scale) post treatment: 8    ASSESSMENT/Changes in Function: Patient presents to physical therapy for evaluation for stair handrail per PCP request with patient reporting assistance required with stair management with patient with 3 stairs to enter 1-story home without handrail available.  Patient with a complicated PMH with patient reporting PSH significant for 3 lumbar surgeries with resultant left foot drop. Patient reports utilization of SPC x3 years with inconsistent utilization of left AFO secondary to foot drop. Patient reports fall history with patient subjectively reporting ambulation tolerance <50 yards and limited community access secondary to poor activity tolerance and diminished endurance. Patient subjectively reports receival of assistance from home care nurse 4 hours/day x6 days per week with assistance required with completion of household and self-care ADLs. Patient lives with family with patient reporting family assistance required with completion of all household ADLs. Observationally patient noted with left foot drop during gait with inconsistent foot clearance resulting in instability with ambulation. Patient demonstrates an unsafe gait speed with gait speed noted to be 0.91 miles/hour with patient demonstrating Patino Balance Score 35/56 (significant increase in falls risk <40/56). Patient as well noted with generalized diminished endurance as demonstrated by the 30-second sit-to-stand test. With assessment of LE strength patient noted with generalized diminished strength bilaterally with no muscular contraction visible with attempted assessment of left ankle dorsiflexion, a finding consistent with subjectively reported foot drop. With completion of activity-specific balance confidence scale patient with score of 40%, representing significantly poor balance self-confidence. Observed stair management (4 stairs -ascent/descent) with use of SPC in comparison to use of SPC and single handrail assist. With use of SPC and single handrail assistance patient noted with ability to complete with a step-to gait pattern independently whereas with SPC alone CGA to minimal assistance required.     Patient would benefit from the installation of a handrail on home entry stairway in order to increase independence with stair management, improve overall community access and decrease falls risk. In case of emergency, handrail placement on entry stairs would ensure patient's ability to safely exit the home. At this time patient declines the receival of physical therapy services. [x]  See Plan of Care  []  See progress note/recertification  []  See Discharge Summary         Progress towards goals / Updated goals:  Short Term Goals: To be accomplished in 1 treatments:  1. Patient will demonstrate ability to complete prescribed HEP as indicated and will verbal understanding.   At Eval: HEP provided with patient with patient verbalizing understanding and demonstrating correct performance with completion    PLAN  [x]  Upgrade activities as tolerated     []  Continue plan of care  []  Update interventions per flow sheet       []  Discharge due to:_  []  Other:_      Milla Solo, WENDI 3/23/2018  6:46 AM    Future Appointments  Date Time Provider Preston Granados   3/23/2018 12:00 PM Heavenly Kyle [facility]   3/26/2018 11:45 AM Fidel Escobar MD [facility] [address]

## 2018-03-26 ENCOUNTER — OFFICE VISIT (OUTPATIENT)
Dept: ONCOLOGY | Age: 68
End: 2018-03-26

## 2018-03-26 ENCOUNTER — HOSPITAL ENCOUNTER (OUTPATIENT)
Dept: ONCOLOGY | Age: 68
Discharge: HOME | End: 2018-03-26

## 2018-03-26 VITALS
BODY MASS INDEX: 30.77 KG/M2 | HEART RATE: 80 BPM | TEMPERATURE: 98.6 F | WEIGHT: 203 LBS | SYSTOLIC BLOOD PRESSURE: 149 MMHG | HEIGHT: 68 IN | DIASTOLIC BLOOD PRESSURE: 81 MMHG

## 2018-03-26 DIAGNOSIS — E55.9 VITAMIN D DEFICIENCY: ICD-10-CM

## 2018-03-26 DIAGNOSIS — D46.9 MYELODYSPLASTIC SYNDROME (HCC): ICD-10-CM

## 2018-03-26 DIAGNOSIS — D46.9 MYELODYSPLASTIC SYNDROME (HCC): Primary | ICD-10-CM

## 2018-03-26 DIAGNOSIS — M25.50 CHRONIC PAIN OF MULTIPLE JOINTS: ICD-10-CM

## 2018-03-26 DIAGNOSIS — G89.29 CHRONIC PAIN OF MULTIPLE JOINTS: ICD-10-CM

## 2018-03-26 LAB
BASO+EOS+MONOS # BLD AUTO: 0.3 K/UL (ref 0–2.3)
BASO+EOS+MONOS # BLD AUTO: 7 % (ref 0.1–17)
DIFFERENTIAL METHOD BLD: ABNORMAL
ERYTHROCYTE [DISTWIDTH] IN BLOOD BY AUTOMATED COUNT: 14.3 % (ref 11.5–14.5)
HCT VFR BLD AUTO: 36.2 % (ref 36–48)
HGB BLD-MCNC: 12.2 G/DL (ref 12–16)
LYMPHOCYTES # BLD: 1.2 K/UL (ref 1.1–5.9)
LYMPHOCYTES NFR BLD: 24 % (ref 14–44)
MCH RBC QN AUTO: 32.4 PG (ref 25–35)
MCHC RBC AUTO-ENTMCNC: 33.7 G/DL (ref 31–37)
MCV RBC AUTO: 96.3 FL (ref 78–102)
NEUTS SEG # BLD: 3.5 K/UL (ref 1.8–9.5)
NEUTS SEG NFR BLD: 70 % (ref 40–70)
PLATELET # BLD AUTO: 316 K/UL (ref 140–440)
RBC # BLD AUTO: 3.76 M/UL (ref 4.1–5.1)
WBC # BLD AUTO: 5 K/UL (ref 4.5–13)

## 2018-03-26 RX ORDER — OXYCODONE AND ACETAMINOPHEN 10; 325 MG/1; MG/1
1 TABLET ORAL
Qty: 120 TAB | Refills: 0 | Status: SHIPPED | OUTPATIENT
Start: 2018-03-26 | End: 2018-04-24 | Stop reason: SDUPTHER

## 2018-03-26 NOTE — MR AVS SNAPSHOT
[address] [address] 200 [facility] 
[phone] Patient: Sim Tariq MRN: [id number] FOC:95/13/2465 Visit Information Date & Time Provider Department Dept. Phone Encounter #  
 3/26/2018 11:45 AM Arielle Wei MD [facility] Oncology [phone] [id number] Your Appointments 6/25/2018 10:45 AM  
Office Visit with Arielle Wei MD  
[address]  Oncology [facility] Appt Note: 3 MO RET  
 [address] [address] [place] [address] [facility] [address] [employer], [address] [place] [address] Upcoming Health Maintenance Date Due Hepatitis C Screening 1950 DTaP/Tdap/Td series (1 - Tdap) 11/19/1969 BREAST CANCER SCRN MAMMOGRAM 11/19/1998 FOBT Q 1 YEAR AGE 50-75 11/19/1998 ZOSTER VACCINE AGE 60> 9/19/2008 GLAUCOMA SCREENING Q2Y 11/19/2013 Bone Densitometry (Dexa) Screening 11/19/2013 Pneumococcal 65+ High/Highest Risk (1 of 2 - PCV13) 11/19/2013 Influenza Age 5 to Adult 8/1/2017 MEDICARE YEARLY EXAM 3/14/2018 Allergies as of 3/26/2018  Review Complete On: 3/23/2018 By: Morro Denise PT No Known Allergies Current Immunizations  Never Reviewed No immunizations on file. Not reviewed this visit You Were Diagnosed With   
  
 Codes Comments Myelodysplastic syndrome ([facility] [address]    -  Primary ICD-10-CM: D46.9 ICD-9-CM: 238.75 Vitamin D deficiency     ICD-10-CM: E55.9 ICD-9-CM: 268.9 Chronic pain of multiple joints     ICD-10-CM: M25.50, G89.29 ICD-9-CM: 719.49, 338.29 Vitals BP Pulse Temp Height(growth percentile) Weight(growth percentile) BMI  
 149/81 80 98.6 °F (37 °C) 5' 8\" (1.727 m) 203 lb (92.1 kg) 30.87 kg/m2 Smoking Status Current Every Day Smoker Vitals History BMI and BSA Data Body Mass Index Body Surface Area  
 30.87 kg/m 2 2.1 m 2 Preferred Pharmacy Pharmacy Name Phone [address] [facility], [address] Your Updated Medication List  
  
   
This list is accurate as of 3/26/18 12:41 PM.  Always use your most recent med list. amLODIPine 10 mg tablet Commonly known as:  Ardeen Squire Take  by mouth daily. benazepril 20 mg tablet Commonly known as:  LOTENSIN Take 20 mg by mouth daily. cyclobenzaprine 10 mg tablet Commonly known as:  FLEXERIL Take 1 Tab by mouth three (3) times daily as needed for Muscle Spasm(s). ergocalciferol 50,000 unit capsule Commonly known as:  VITAMIN D2 Take 1 Cap by mouth every seven (7) days. Indications: Vitamin D Deficiency  
  
 ferrous sulfate 325 mg (65 mg iron) tablet Take 1 Tab by mouth Daily (before breakfast). gabapentin 300 mg capsule Commonly known as:  NEURONTIN  
  
 iron polysaccharides 150 mg iron capsule Commonly known as:  FERREX 150 Take 1 Cap by mouth two (2) times a day.  
  
 naloxone 2 mg/actuation Spry Commonly known as:  [employer] Use 1 spray intranasally into 1 nostril. Use a new Narcan nasal spray for subsequent doses and administer into alternating nostrils. May repeat every 2 to 3 minutes as needed. Indications: OPIATE-INDUCED RESPIRATORY DEPRESSION, OPIOID TOXICITY  
  
 oxyCODONE-acetaminophen  mg per tablet Commonly known as:  PERCOCET Take 1 Tab by mouth every six (6) hours as needed for Pain. Max Daily Amount: 4 Tabs. pantoprazole 40 mg tablet Commonly known as:  PROTONIX Take 1 Tab by mouth daily. pravastatin 20 mg tablet Commonly known as:  PRAVACHOL Take 20 mg by mouth nightly. PROAIR HFA 90 mcg/actuation inhaler Generic drug:  albuterol We Performed the Following COMPLETE CBC & AUTO DIFF WBC [14591 CPT(R)] To-Do List   
 03/26/2018 Lab:  CBC WITH 3 PART DIFF   
  
 03/26/2018 Lab:  FERRITIN   
  
 03/26/2018 Lab:  IRON PROFILE   
  
 03/26/2018 Lab:  METABOLIC PANEL, COMPREHENSIVE   
  
 03/26/2018 Lab:  VITAMIN D, 25 HYDROXY Introducing [facility]! Cezar Cardenas introduces [email or web address] patient portal. Now you can access parts of your medical record, email your doctor's office, and request medication refills online. 1. In your internet browser, go to [email or web address] 2. Click on the First Time User? Click Here link in the Sign In box. You will see the New Member Sign Up page. 3. Enter your [email or web address] Access Code exactly as it appears below. You will not need to use this code after youve completed the sign-up process. If you do not sign up before the expiration date, you must request a new code. · [email or web address] Access Code: [email or web address] Expires: 6/10/2018 11:09 AM 
 
4. Enter the last four digits of your Social Security Number (xxxx) and Date of Birth (mm/dd/yyyy) as indicated and click Submit. You will be taken to the next sign-up page. 5. Create a [email or web address] ID. This will be your [email or web address] login ID and cannot be changed, so think of one that is secure and easy to remember. 6. Create a [email or web address] password. You can change your password at any time. 7. Enter your Password Reset Question and Answer. This can be used at a later time if you forget your password. 8. Enter your e-mail address. You will receive e-mail notification when new information is available in [address] 9. Click Sign Up. You can now view and download portions of your medical record. 10. Click the Download Summary menu link to download a portable copy of your medical information. If you have questions, please visit the Frequently Asked Questions section of the [email or web address] website. Remember, magnify360 is NOT to be used for urgent needs. For medical emergencies, dial 911. Now available from your iPhone and Android! Please provide this summary of care documentation to your next provider. Your primary care clinician is listed as Caro Louis. If you have any questions after today's visit, please call [phone].

## 2018-03-26 NOTE — PROGRESS NOTES
Hematology/Oncology  Progress Note    Name: Yoselin Schreiber  Date: 3/26/2018  : 1950    PCP:Khadra Howard MD      Ms. Florencio Martinez is a 69year old female who was seen for management of her myelodysplastic syndrome/refractory anemia. The patient also has quite severe arthritis. Current therapy: Percocet 10 mg 1 tablet every 6 hours as needed for pain control    Subjective:     Ms. Florencio Martinez is a 49-year-old  woman who has myelodysplastic syndrome with refractory anemia. She also suffers from quite severe arthritis involving her neck, back, wrist and knees. She is ambulating with the use of a cane at this time. She is currently doing phycial therapy. Percocet as needed for her pain. Her appetite and energy level are good. She denies fevers, chest pains, or shortness of breath. She has no additional complaints. The patient is requesting a renewal of her Percocet prescription today. Currently she is ambulating with the use of a cane. She report that Her range of motion of her fingers is getting better and she is able to  objects a little easier. Past medical history, family history, and social history: these were reviewed and remains unchanged. Past Medical History:   Diagnosis Date    Chronic pain     Chronic leg pain    Hypercholesterolemia     Hypertension     Myelodysplastic syndrome, unspecified ([address]     Refractory Anemia      Past Surgical History:   Procedure Laterality Date   Bethanne Wilton SURGERY  9932,5    Work related back injury     Social History     Social History    Marital status: UNKNOWN     Spouse name: N/A    Number of children: N/A    Years of education: N/A     Occupational History    Not on file.      Social History Main Topics    Smoking status: Current Every Day Smoker     Packs/day: 1.50     Types: Cigarettes    Smokeless tobacco: Not on file    Alcohol use Yes      Comment: Occasional - beer    Drug use: Not on file    Sexual activity: Not on file     Other Topics Concern    Not on file     Social History Narrative     No family history on file. Current Outpatient Prescriptions   Medication Sig Dispense Refill    oxyCODONE-acetaminophen (PERCOCET)  mg per tablet Take 1 Tab by mouth every six (6) hours as needed for Pain. Max Daily Amount: 4 Tabs. 120 Tab 0    ergocalciferol (VITAMIN D2) 50,000 unit capsule Take 1 Cap by mouth every seven (7) days. Indications: Vitamin D Deficiency 12 Cap 2    naloxone (NARCAN) 2 mg/actuation spry Use 1 spray intranasally into 1 nostril. Use a new Narcan nasal spray for subsequent doses and administer into alternating nostrils. May repeat every 2 to 3 minutes as needed. Indications: OPIATE-INDUCED RESPIRATORY DEPRESSION, OPIOID TOXICITY 1 Box 0    iron polysaccharides (FERREX 150) 150 mg iron capsule Take 1 Cap by mouth two (2) times a day. 90 Cap 6    ferrous sulfate 325 mg (65 mg iron) tablet Take 1 Tab by mouth Daily (before breakfast). 90 Tab 3    pantoprazole (PROTONIX) 40 mg tablet Take 1 Tab by mouth daily. 30 Tab 1    PROAIR HFA 90 mcg/actuation inhaler       gabapentin (NEURONTIN) 300 mg capsule       cyclobenzaprine (FLEXERIL) 10 mg tablet Take 1 Tab by mouth three (3) times daily as needed for Muscle Spasm(s). 90 Tab 6    benazepril (LOTENSIN) 20 mg tablet Take 20 mg by mouth daily.  pravastatin (PRAVACHOL) 20 mg tablet Take 20 mg by mouth nightly.  amLODIPine (NORVASC) 10 mg tablet Take  by mouth daily. Review of Systems  Constitutional: The patient has complains of pain in the neck due to severe arthritis. HEENT: The patient denies recent head trauma, eye pain, blurred vision,  hearing deficit, oropharyngeal mucosal pain or lesions, and the patient denies throat pain or discomfort. Lymphatics: The patient denies palpable peripheral lymphadenopathy. Hematologic: The patient denies having bruising, bleeding, or progressive fatigue.   Respiratory: Patient denies having shortness of breath, cough, sputum production, fever, or dyspnea on exertion. Cardiovascular: The patient denies having leg pain, leg swelling, heart palpitations, chest permit, chest pain, or lightheadedness. The patient denies having dyspnea on exertion. Gastrointestinal: The patient denies having nausea, emesis, or diarrhea. The patient denies having any hematemesis or blood in the stool. Genitourinary: Patient denies having urinary urgency, frequency, or dysuria. The patient denies having blood in the urine. Psychological: The patient denies having symptoms of nervousness, anxiety, depression, or thoughts of harming himself some of this. Skin: Patient denies having skin rashes, skin, ulcerations, or unexplained itching or pruritus. Musculoskeletal: The patient complains of pain in neck      Objective:     Visit Vitals    /81    Pulse 80    Temp 98.6 °F (37 °C)    Ht 5' 8\" (1.727 m)    Wt 92.1 kg (203 lb)    BMI 30.87 kg/m2     Pain 6/10 ECOG 0  Physical Exam:   Gen. Appearance: The patient is in no acute distress. Skin: There is no bruise or rash. HEENT: The exam is unremarkable. Neck: Supple without lymphadenopathy or thyromegaly. Lungs: Clear to auscultation and percussion; there are no wheezes or rhonchi. Heart: Regular rate and rhythm; there are no murmurs, gallops, or rubs. Abdomen: Bowel sounds are present and normal.  There is no guarding, tenderness, or hepatosplenomegaly. Extremities: There is no clubbing, cyanosis, or edema. Neurologic: There are no focal neurologic deficits. Lymphatics: There is no palpable peripheral lymphadenopathy. Musculoskeletal: The patient has full range of motion at all joints. The patient has some deformity in her knee joints bilaterally. She is using a cane for mobility support. Psychological/psychiatric: There is no clinical evidence of anxiety, depression, or melancholy.     Lab data:      Results for orders placed or performed during the hospital encounter of 03/26/18   CBC WITH 3 PART DIFF     Status: Abnormal   Result Value Ref Range Status    WBC 5.0 4.5 - 13.0 K/uL Final    RBC 3.76 (L) 4.10 - 5.10 M/uL Final    HGB 12.2 12.0 - 16 g/dL Final    HCT 36.2 36 - 48 % Final    MCV 96.3 78 - 102 FL Final    MCH 32.4 25.0 - 35.0 PG Final    MCHC 33.7 31 - 37 g/dL Final    RDW 14.3 11.5 - 14.5 % Final    PLATELET 200 214 - 976 K/uL Final    NEUTROPHILS 70 40 - 70 % Final    MIXED CELLS 7 0.1 - 17 % Final    LYMPHOCYTES 24 14 - 44 % Final    ABS. NEUTROPHILS 3.5 1.8 - 9.5 K/UL Final    ABS. MIXED CELLS 0.3 0.0 - 2.3 K/uL Final    ABS. LYMPHOCYTES 1.2 1.1 - 5.9 K/UL Final     Comment: Test performed at [address] [address] Location. Results Reviewed by Medical Director. DF AUTOMATED   Final           Assessment:     1. Myelodysplastic syndrome ([facility] [address]    2. Vitamin D deficiency    3. Chronic pain of multiple joints      Plan:     Myelodysplastic syndrome/refractory anemia: I have explained to the patient  that the CBC today revealed that her current hemoglobin is 12.2 g/dL with hematocrit of 36.2%. We will continue to monitor her at 3 month intervals with the option of providing Procrit if she ever declines below 10 g on a hemoglobin below 30% on the hematocrit. Arthritis(progressive problem): The patient is complaining of progressive pain in the neck. Also back and right wrist and hand. She continues to use a narcotic based pain medication. I will renew her Percocet Rx today. Generally, she receives Percocet 10 mg every 6-8 hours as needed. Vitamin D Deficiency: I have explained to the patient and her vitamin D level on 11/29/2017 was 27.1 ng/mL. She is currently taking vitamin D 50,000 units weekly for 12 weeks. I will reorder her vitamin D level today. will see her back in clinic in 12 weeks to see how well she is doing. Follow-up will occur in 3 months for a complete reassessment.     Orders Placed This Encounter    COMPLETE CBC & AUTO DIFF WBC    InHouse CBC (LYCEEM)     Standing Status:   Future     Number of Occurrences:   1     Standing Expiration Date:   4/2/2018    IRON PROFILE     Standing Status:   Future     Standing Expiration Date:   0/47/0770    METABOLIC PANEL, COMPREHENSIVE     Standing Status:   Future     Standing Expiration Date:   3/27/2019    FERRITIN     Standing Status:   Future     Standing Expiration Date:   3/27/2019    VITAMIN D, 25 HYDROXY     Standing Status:   Future     Standing Expiration Date:   3/27/2019    oxyCODONE-acetaminophen (PERCOCET)  mg per tablet     Sig: Take 1 Tab by mouth every six (6) hours as needed for Pain. Max Daily Amount: 4 Tabs. Dispense:  120 Tab     Refill:  0         Amy De La Rosa, GAGANDEEP  3/26/2018     I have assessed the patient independently and  agree with the full assessment as outlined.   Lydia Napoles MD, [place]

## 2018-03-26 NOTE — PATIENT INSTRUCTIONS
Chronic Pain: Care Instructions  Your Care Instructions    Chronic pain is pain that lasts a long time (months or even years) and may or may not have a clear cause. It is different from acute pain, which usually does have a clear cause-like an injury or illness-and gets better over time. Chronic pain:  · Lasts over time but may vary from day to day. · Does not go away despite efforts to end it. · May disrupt your sleep and lead to fatigue. · May cause depression or anxiety. · May make your muscles tense, causing more pain. · Can disrupt your work, hobbies, home life, and relationships with friends and family. Chronic pain is a very real condition. It is not just in your head. Treatment can help and usually includes several methods used together, such as medicines, physical therapy, exercise, and other treatments. Learning how to relax and changing negative thought patterns can also help you cope. Chronic pain is complex. Taking an active role in your treatment will help you better manage your pain. Tell your doctor if you have trouble dealing with your pain. You may have to try several things before you find what works best for you. Follow-up care is a key part of your treatment and safety. Be sure to make and go to all appointments, and call your doctor if you are having problems. It's also a good idea to know your test results and keep a list of the medicines you take. How can you care for yourself at home? · Pace yourself. Break up large jobs into smaller tasks. Save harder tasks for days when you have less pain, or go back and forth between hard tasks and easier ones. Take rest breaks. · Relax, and reduce stress. Relaxation techniques such as deep breathing or meditation can help. · Keep moving. Gentle, daily exercise can help reduce pain over the long run. Try low- or no-impact exercises such as walking, swimming, and stationary biking. Do stretches to stay flexible.   · Try heat, cold packs, and massage. · Get enough sleep. Chronic pain can make you tired and drain your energy. Talk with your doctor if you have trouble sleeping because of pain. · Think positive. Your thoughts can affect your pain level. Do things that you enjoy to distract yourself when you have pain instead of focusing on the pain. See a movie, read a book, listen to music, or spend time with a friend. · If you think you are depressed, talk to your doctor about treatment. · Keep a daily pain diary. Record how your moods, thoughts, sleep patterns, activities, and medicine affect your pain. You may find that your pain is worse during or after certain activities or when you are feeling a certain emotion. Having a record of your pain can help you and your doctor find the best ways to treat your pain. · Take pain medicines exactly as directed. ¨ If the doctor gave you a prescription medicine for pain, take it as prescribed. ¨ If you are not taking a prescription pain medicine, ask your doctor if you can take an over-the-counter medicine. Reducing constipation caused by pain medicine  · Include fruits, vegetables, beans, and whole grains in your diet each day. These foods are high in fiber. · Drink plenty of fluids, enough so that your urine is light yellow or clear like water. If you have kidney, heart, or liver disease and have to limit fluids, talk with your doctor before you increase the amount of fluids you drink. · If your doctor recommends it, get more exercise. Walking is a good choice. Bit by bit, increase the amount you walk every day. Try for at least 30 minutes on most days of the week. · Schedule time each day for a bowel movement. A daily routine may help. Take your time and do not strain when having a bowel movement. When should you call for help? Call your doctor now or seek immediate medical care if:  ? · Your pain gets worse or is out of control.    ? · You feel down or blue, or you do not enjoy things like you once did. You may be depressed, which is common in people with chronic pain. Depression can be treated. ? · You have vomiting or cramps for more than 2 hours. ? Watch closely for changes in your health, and be sure to contact your doctor if:  ? · You cannot sleep because of pain. ? · You are very worried or anxious about your pain. ? · You have trouble taking your pain medicine. ? · You have any concerns about your pain medicine. ? · You have trouble with bowel movements, such as:  ¨ No bowel movement in 3 days. ¨ Blood in the anal area, in your stool, or on the toilet paper. ¨ Diarrhea for more than 24 hours. Where can you learn more? Go to http://marjorie-marianela.info/. Enter N004 in the search box to learn more about \"Chronic Pain: Care Instructions. \"  Current as of: October 14, 2016  Content Version: 11.4  © 4388-0868 [employer]. Care instructions adapted under license by [employer] (which disclaims liability or warranty for this information). If you have questions about a medical condition or this instruction, always ask your healthcare professional. [place] [address] any warranty or liability for your use of this information. Learning About Vitamin D  Why is it important to get enough vitamin D? Your body needs vitamin D to absorb calcium. Calcium keeps your bones and muscles, including your heart, healthy and strong. If your muscles don't get enough calcium, they can cramp, hurt, or feel weak. You may have long-term (chronic) muscle aches and pains. If you don't get enough vitamin D throughout life, you have an increased chance of having thin and brittle bones (osteoporosis) in your later years. Children who don't get enough vitamin D may not grow as much as others their age. They also have a chance of getting a rare disease called rickets. It causes weak bones. Vitamin D and calcium are added to many foods.  And your body uses sunshine to make its own vitamin D. How much vitamin D do you need? The [place] of [employer] recommends that people ages 3 through 79 get 600 IU (international units) every day. Adults 71 and older need 800 IU every day. Blood tests for vitamin D can check your vitamin D level. But there is no standard normal range used by all laboratories. The [place] of [employer] recommends a blood level of 20 ng/mL of vitamin D for healthy bones. And most people in the [place] and [facility]) meet this goal.  How can you get more vitamin D? Foods that contain vitamin D include:  · [place], tuna, and mackerel. These are some of the best foods to eat when you need to get more vitamin D.  · Cheese, egg yolks, and beef liver. These foods have vitamin D in small amounts. · Milk, soy drinks, orange juice, yogurt, margarine, and some kinds of cereal have vitamin D added to them. Some people don't make vitamin D as well as others. They may have to take extra care in getting enough vitamin D. Things that reduce how much vitamin D your body makes include:  · Dark skin, such as many  Americans have. · Age, especially if you are older than 72. · Digestive problems, such as Crohn's or celiac disease. · Liver and kidney disease. Some people who do not get enough vitamin D may need supplements. Are there any risks from taking vitamin D?  · Too much vitamin D:  ¨ Can damage your kidneys. ¨ Can cause nausea and vomiting, constipation, and weakness. ¨ Raises the amount of calcium in your blood. If this happens, you can get confused or have an irregular heart rhythm. · Vitamin D may interact with other medicines. Tell your doctor about all of the medicines you take, including over-the-counter drugs, herbs, and pills. Tell your doctor about all of your current medical problems. Where can you learn more? Go to http://marjorie-marianela.info/.   Enter [id number] in the search box to learn more about \"Learning About Vitamin D.\"  Current as of: May 12, 2017  Content Version: 11.4  © 0277-3053 [employer]. Care instructions adapted under license by [employer] (which disclaims liability or warranty for this information). If you have questions about a medical condition or this instruction, always ask your healthcare professional. [address] any warranty or liability for your use of this information. Myelodysplastic Syndromes: Care Instructions  Your Care Instructions  Myelodysplastic syndromes, also called MDS, are a group of rare conditions in which the bone marrow does not make enough healthy blood cells. Normally, the bone marrow makes red blood cells, white blood cells, and platelets. These cells carry oxygen in the blood, help the body fight infections, and help the blood clot. With MDS, you may feel weak and tired, get infections often, and bruise easily, although symptoms tend to vary. MDS is a form of blood cancer. In some cases, MDS can turn into acute myeloid leukemia (AML), another type of cancer. Some people develop MDS after treatment for cancer or exposure to pesticides or other chemicals. But in most cases, the cause of MDS is not known. Your doctor will use the results of blood tests to guide your treatment. There are many types of MDS, with different treatment plans for each. If you have enough red blood cells and are feeling all right, you may not need active treatment, but you and your doctor will want to watch your condition carefully. If you start feeling lightheaded and have no energy, you may need a blood transfusion. Your doctor also may give you antibiotics to prevent or treat infection. Follow-up care is a key part of your treatment and safety. Be sure to make and go to all appointments, and call your doctor if you are having problems. It's also a good idea to know your test results and keep a list of the medicines you take.   How can you care for yourself at home? · Take your medicines exactly as prescribed. Call your doctor if you think you are having a problem with your medicine. You will get more details on the specific medicines your doctor prescribes. · If your doctor prescribed antibiotics, take them as directed. Do not stop taking them just because you feel better. You need to take the full course of antibiotics. If you have side effects from antibiotics, tell your doctor. · Take steps to control your stress and workload. Learn relaxation techniques. ¨ Share your feelings. Stress and tension affect our emotions. By expressing your feelings to others, you may be able to understand and cope with them. ¨ Consider joining a support group. Talking about a problem with your spouse, a good friend, or other people with similar problems is a good way to reduce tension and stress. ¨ Express yourself with art. Try writing, crafts, dance, or art to relieve stress. ¨ Be kind to your body and mind. Getting enough sleep, eating a healthy diet, and taking time to do things you enjoy can contribute to an overall feeling of balance in your life and help reduce stress. ¨ Get help if you need it. Discuss your concerns with your doctor or counselor. · Do not smoke. Smoking can make blood problems worse. If you need help quitting, talk to your doctor about stop-smoking programs and medicines. These can increase your chances of quitting for good. · If you have not already done so, prepare a list of advance directives. Advance directives are instructions to your doctor and family members about what kind of care you want if you become unable to speak or express yourself. · Call the [employer] ([phone]) or visit its website at [employer] [employer]. [employer] for more information. When should you call for help? Call [phone] anytime you think you may need emergency care. For example, call if:  ? · You passed out (lost consciousness).    ?Call your doctor now or seek immediate medical care if:  ? · You have a fever. ? · You have abnormal bleeding. ? · You have new or worse pain. ? · You think you have an infection. ? · You have new symptoms, such as a cough, belly pain, vomiting, diarrhea, or a rash. ? Watch closely for changes in your health, and be sure to contact your doctor if:  ? · You are much more tired than usual.   ? · You have swollen glands in your armpits, groin, or neck. ? · You do not get better as expected. Where can you learn more? Go to http://marjorie-marianela.info/. Enter Natalie Watson in the search box to learn more about \"Myelodysplastic Syndromes: Care Instructions. \"  Current as of: May 12, 2017  Content Version: 11.4  © 6817-1729 [employer]. Care instructions adapted under license by [employer] (which disclaims liability or warranty for this information). If you have questions about a medical condition or this instruction, always ask your healthcare professional. [address] any warranty or liability for your use of this information.

## 2018-03-27 LAB
25(OH)D3+25(OH)D2 SERPL-MCNC: 38.4 NG/ML (ref 30–100)
ALBUMIN SERPL-MCNC: 4.5 G/DL (ref 3.6–4.8)
ALBUMIN/GLOB SERPL: 1.7 {RATIO} (ref 1.2–2.2)
ALP SERPL-CCNC: 74 IU/L (ref 39–117)
ALT SERPL-CCNC: 14 IU/L (ref 0–32)
AST SERPL-CCNC: 17 IU/L (ref 0–40)
BILIRUB SERPL-MCNC: <0.2 MG/DL (ref 0–1.2)
BUN SERPL-MCNC: 9 MG/DL (ref 8–27)
BUN/CREAT SERPL: 18 (ref 12–28)
CALCIUM SERPL-MCNC: 9.5 MG/DL (ref 8.7–10.3)
CHLORIDE SERPL-SCNC: 103 MMOL/L (ref 96–106)
CO2 SERPL-SCNC: 24 MMOL/L (ref 18–29)
CREAT SERPL-MCNC: 0.5 MG/DL (ref 0.57–1)
FERRITIN SERPL-MCNC: 148 NG/ML (ref 15–150)
GFR SERPLBLD CREATININE-BSD FMLA CKD-EPI: 114 ML/MIN/1.73
GFR SERPLBLD CREATININE-BSD FMLA CKD-EPI: 99 ML/MIN/1.73
GLOBULIN SER CALC-MCNC: 2.7 G/DL (ref 1.5–4.5)
GLUCOSE SERPL-MCNC: 99 MG/DL (ref 65–99)
IRON SATN MFR SERPL: 26 % (ref 15–55)
IRON SERPL-MCNC: 64 UG/DL (ref 27–139)
POTASSIUM SERPL-SCNC: 4.4 MMOL/L (ref 3.5–5.2)
PROT SERPL-MCNC: 7.2 G/DL (ref 6–8.5)
SODIUM SERPL-SCNC: 143 MMOL/L (ref 134–144)
TIBC SERPL-MCNC: 245 UG/DL (ref 250–450)
UIBC SERPL-MCNC: 181 UG/DL (ref 118–369)

## 2018-04-24 DIAGNOSIS — D46.9 MYELODYSPLASTIC SYNDROME (HCC): ICD-10-CM

## 2018-04-24 RX ORDER — OXYCODONE AND ACETAMINOPHEN 10; 325 MG/1; MG/1
1 TABLET ORAL
Qty: 120 TAB | Refills: 0 | Status: SHIPPED | OUTPATIENT
Start: 2018-04-24 | End: 2018-04-25 | Stop reason: SDUPTHER

## 2018-04-24 NOTE — TELEPHONE ENCOUNTER
Patient called for a refill on the Percocet, please. She did not mention when she wanted to pick it up, just asked if someone would call when ready.

## 2018-04-25 DIAGNOSIS — D46.9 MYELODYSPLASTIC SYNDROME (HCC): ICD-10-CM

## 2018-04-25 RX ORDER — OXYCODONE AND ACETAMINOPHEN 10; 325 MG/1; MG/1
1 TABLET ORAL
Qty: 120 TAB | Refills: 0 | Status: CANCELLED | OUTPATIENT
Start: 2018-04-25

## 2018-04-25 RX ORDER — OXYCODONE AND ACETAMINOPHEN 10; 325 MG/1; MG/1
1 TABLET ORAL
Qty: 120 TAB | Refills: 0 | Status: SHIPPED | OUTPATIENT
Start: 2018-04-25 | End: 2018-05-22 | Stop reason: SDUPTHER

## 2018-05-22 DIAGNOSIS — D46.9 MYELODYSPLASTIC SYNDROME (HCC): ICD-10-CM

## 2018-05-22 RX ORDER — OXYCODONE AND ACETAMINOPHEN 10; 325 MG/1; MG/1
1 TABLET ORAL
Qty: 120 TAB | Refills: 0 | Status: SHIPPED | OUTPATIENT
Start: 2018-05-22 | End: 2018-06-25 | Stop reason: SDUPTHER

## 2018-06-25 ENCOUNTER — HOSPITAL ENCOUNTER (OUTPATIENT)
Dept: ONCOLOGY | Age: 68
Discharge: HOME | End: 2018-06-25

## 2018-06-25 ENCOUNTER — OFFICE VISIT (OUTPATIENT)
Dept: ONCOLOGY | Age: 68
End: 2018-06-25

## 2018-06-25 VITALS
SYSTOLIC BLOOD PRESSURE: 157 MMHG | HEART RATE: 77 BPM | WEIGHT: 198 LBS | BODY MASS INDEX: 30.11 KG/M2 | DIASTOLIC BLOOD PRESSURE: 70 MMHG | TEMPERATURE: 98.2 F

## 2018-06-25 DIAGNOSIS — D46.4 REFRACTORY ANEMIA DUE TO MYELODYSPLASTIC SYNDROME (HCC): ICD-10-CM

## 2018-06-25 DIAGNOSIS — G89.29 CHRONIC PAIN OF MULTIPLE JOINTS: ICD-10-CM

## 2018-06-25 DIAGNOSIS — D46.9 MYELODYSPLASTIC SYNDROME (HCC): ICD-10-CM

## 2018-06-25 DIAGNOSIS — D46.9 MYELODYSPLASTIC SYNDROME (HCC): Primary | ICD-10-CM

## 2018-06-25 DIAGNOSIS — M25.50 CHRONIC PAIN OF MULTIPLE JOINTS: ICD-10-CM

## 2018-06-25 DIAGNOSIS — R04.0 NOSEBLEED: ICD-10-CM

## 2018-06-25 DIAGNOSIS — E55.9 VITAMIN D DEFICIENCY: ICD-10-CM

## 2018-06-25 DIAGNOSIS — M19.90 ARTHRITIS: ICD-10-CM

## 2018-06-25 LAB
BASO+EOS+MONOS # BLD AUTO: 0.3 K/UL (ref 0–2.3)
BASO+EOS+MONOS # BLD AUTO: 5 % (ref 0.1–17)
DIFFERENTIAL METHOD BLD: ABNORMAL
ERYTHROCYTE [DISTWIDTH] IN BLOOD BY AUTOMATED COUNT: 14.8 % (ref 11.5–14.5)
HCT VFR BLD AUTO: 37.2 % (ref 36–48)
HGB BLD-MCNC: 12.6 G/DL (ref 12–16)
LYMPHOCYTES # BLD: 1.2 K/UL (ref 1.1–5.9)
LYMPHOCYTES NFR BLD: 20 % (ref 14–44)
MCH RBC QN AUTO: 32.5 PG (ref 25–35)
MCHC RBC AUTO-ENTMCNC: 33.9 G/DL (ref 31–37)
MCV RBC AUTO: 95.9 FL (ref 78–102)
NEUTS SEG # BLD: 4.3 K/UL (ref 1.8–9.5)
NEUTS SEG NFR BLD: 75 % (ref 40–70)
PLATELET # BLD AUTO: 302 K/UL (ref 140–440)
RBC # BLD AUTO: 3.88 M/UL (ref 4.1–5.1)
WBC # BLD AUTO: 5.8 K/UL (ref 4.5–13)

## 2018-06-25 RX ORDER — OXYCODONE AND ACETAMINOPHEN 10; 325 MG/1; MG/1
1 TABLET ORAL
Qty: 120 TAB | Refills: 0 | Status: SHIPPED | OUTPATIENT
Start: 2018-06-25 | End: 2018-07-23 | Stop reason: SDUPTHER

## 2018-06-25 NOTE — MR AVS SNAPSHOT
[address] [address] [address] [facility] 
[phone] Patient: Aubree Ortega MRN: [id number] OXX:[id number] Visit Information Date & Time Provider Department Dept. Phone Encounter #  
 6/25/2018 10:45 AM Che Kendall MD [facility] [phone] [id number] Follow-up Instructions Return in about 3 months (around 9/25/2018). Your Appointments 9/24/2018 10:00 AM  
Office Visit with Che Kendall MD  
[facility]) Appt Note: 3 MO RET  
 [address] [place], [place] [address] [place] [address], [address] [place] [address] Upcoming Health Maintenance Date Due Hepatitis C Screening 1950 DTaP/Tdap/Td series (1 - Tdap) 11/19/1969 BREAST CANCER SCRN MAMMOGRAM 11/19/1998 FOBT Q 1 YEAR AGE 50-75 11/19/1998 ZOSTER VACCINE AGE 60> 9/19/2008 GLAUCOMA SCREENING Q2Y 11/19/2013 Bone Densitometry (Dexa) Screening 11/19/2013 Pneumococcal 65+ High/Highest Risk (1 of 2 - PCV13) 11/19/2013 MEDICARE YEARLY EXAM 3/14/2018 Influenza Age 5 to Adult 8/1/2018 Allergies as of 6/25/2018  Review Complete On: 6/25/2018 By: Che Kendall MD  
 No Known Allergies Current Immunizations  Never Reviewed No immunizations on file. Not reviewed this visit You Were Diagnosed With   
  
 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  
 30.11 kg/m 2 2.08 m 2 Preferred Pharmacy Pharmacy Name Phone [address] [facility], [address] Your Updated Medication List  
  
   
This list is accurate as of 6/25/18 11:12 AM.  Always use your most recent med list. amLODIPine 10 mg tablet Commonly known as:  Kathryn Thu Take  by mouth daily. benazepril 20 mg tablet Commonly known as:  LOTENSIN Take 20 mg by mouth daily. cyclobenzaprine 10 mg tablet Commonly known as:  FLEXERIL Take 1 Tab by mouth three (3) times daily as needed for Muscle Spasm(s). ergocalciferol 50,000 unit capsule Commonly known as:  VITAMIN D2 Take 1 Cap by mouth every seven (7) days. Indications: Vitamin D Deficiency  
  
 ferrous sulfate 325 mg (65 mg iron) tablet Take 1 Tab by mouth Daily (before breakfast). gabapentin 300 mg capsule Commonly known as:  NEURONTIN  
  
 iron polysaccharides 150 mg iron capsule Commonly known as:  FERREX 150 Take 1 Cap by mouth two (2) times a day.  
  
 naloxone 2 mg/actuation Spry Commonly known as:  [employer] Use 1 spray intranasally into 1 nostril. Use a new Narcan nasal spray for subsequent doses and administer into alternating nostrils. May repeat every 2 to 3 minutes as needed. Indications: OPIATE-INDUCED RESPIRATORY DEPRESSION, OPIOID TOXICITY  
  
 oxyCODONE-acetaminophen  mg per tablet Commonly known as:  PERCOCET Take 1 Tab by mouth every six (6) hours as needed for Pain. Max Daily Amount: 4 Tabs. pantoprazole 40 mg tablet Commonly known as:  PROTONIX Take 1 Tab by mouth daily. pravastatin 20 mg tablet Commonly known as:  PRAVACHOL Take 20 mg by mouth nightly. PROAIR HFA 90 mcg/actuation inhaler Generic drug:  albuterol Prescriptions Printed Refills  
 oxyCODONE-acetaminophen (PERCOCET)  mg per tablet 0 Sig: Take 1 Tab by mouth every six (6) hours as needed for Pain. Max Daily Amount: 4 Tabs. Class: Print Route: Oral  
  
We Performed the Following COMPLETE CBC & AUTO DIFF WBC [49759 CPT(R)] Follow-up Instructions Return in about 3 months (around 9/25/2018). To-Do List   
 06/25/2018 Lab:  CBC WITH 3 PART DIFF   
  
 06/25/2018 Lab:  FERRITIN   
  
 06/25/2018 Lab:  IRON PROFILE   
  
 06/25/2018 Lab:  METABOLIC PANEL, COMPREHENSIVE   
  
 06/25/2018 Lab:  VITAMIN D, 25 HYDROXY Patient Instructions Myelodysplastic Syndromes: Care Instructions Your Care Instructions Myelodysplastic syndromes, also called MDS, are a group of rare conditions in which the bone marrow does not make enough healthy blood cells. Normally, the bone marrow makes red blood cells, white blood cells, and platelets. These cells carry oxygen in the blood, help the body fight infections, and help the blood clot. With MDS, you may feel weak and tired, get infections often, and bruise easily, although symptoms tend to vary. MDS is a form of blood cancer. In some cases, MDS can turn into acute myeloid leukemia (AML), another type of cancer. Some people develop MDS after treatment for cancer or exposure to pesticides or other chemicals. But in most cases, the cause of MDS is not known. Your doctor will use the results of blood tests to guide your treatment. There are many types of MDS, with different treatment plans for each. If you have enough red blood cells and are feeling all right, you may not need active treatment, but you and your doctor will want to watch your condition carefully. If you start feeling lightheaded and have no energy, you may need a blood transfusion.  Your doctor also may give you antibiotics to prevent or treat infection. Follow-up care is a key part of your treatment and safety. Be sure to make and go to all appointments, and call your doctor if you are having problems. It's also a good idea to know your test results and keep a list of the medicines you take. How can you care for yourself at home? · Take your medicines exactly as prescribed. Call your doctor if you think you are having a problem with your medicine. You will get more details on the specific medicines your doctor prescribes. · If your doctor prescribed antibiotics, take them as directed. Do not stop taking them just because you feel better. You need to take the full course of antibiotics. If you have side effects from antibiotics, tell your doctor. · Take steps to control your stress and workload. Learn relaxation techniques. ¨ Share your feelings. Stress and tension affect our emotions. By expressing your feelings to others, you may be able to understand and cope with them. ¨ Consider joining a support group. Talking about a problem with your spouse, a good friend, or other people with similar problems is a good way to reduce tension and stress. ¨ Express yourself with art. Try writing, crafts, dance, or art to relieve stress. ¨ Be kind to your body and mind. Getting enough sleep, eating a healthy diet, and taking time to do things you enjoy can contribute to an overall feeling of balance in your life and help reduce stress. ¨ Get help if you need it. Discuss your concerns with your doctor or counselor. · Do not smoke. Smoking can make blood problems worse. If you need help quitting, talk to your doctor about stop-smoking programs and medicines. These can increase your chances of quitting for good. · If you have not already done so, prepare a list of advance directives.  Advance directives are instructions to your doctor and family members about what kind of care you want if you become unable to speak or express yourself. · Call the Jennifer Sheridan ([phone]) or visit its website at [employer] for more information. When should you call for help? Call 911 anytime you think you may need emergency care. For example, call if: 
? · You passed out (lost consciousness). ?Call your doctor now or seek immediate medical care if: 
? · You have a fever. ? · You have abnormal bleeding. ? · You have new or worse pain. ? · You think you have an infection. ? · You have new symptoms, such as a cough, belly pain, vomiting, diarrhea, or a rash. ? Watch closely for changes in your health, and be sure to contact your doctor if: 
? · You are much more tired than usual.  
? · You have swollen glands in your armpits, groin, or neck. ? · You do not get better as expected. Where can you learn more? Go to http://marjorie-marianela.info/. Enter Adrienne Holder in the search box to learn more about \"Myelodysplastic Syndromes: Care Instructions. \" Current as of: May 12, 2017 Content Version: 11.4 © 9513-8407 [employer]. Care instructions adapted under license by [employer] (which disclaims liability or warranty for this information). If you have questions about a medical condition or this instruction, always ask your healthcare professional. [place] [address] any warranty or liability for your use of this information. Introducing [facility]! [facility] introduces [employer] patient portal. Now you can access parts of your medical record, email your doctor's office, and request medication refills online. 1. In your internet browser, go to [email or web address] 2. Click on the First Time User? Click Here link in the Sign In box. You will see the New Member Sign Up page. 3. Enter your [employer] Access Code exactly as it appears below. You will not need to use this code after youve completed the sign-up process.  If you do not sign up before the expiration date, you must request a new code. · [employer] Access Code: [email or web address] Expires: 9/23/2018 11:11 AM 
 
4. Enter the last four digits of your Social Security Number (xxxx) and Date of Birth (mm/dd/yyyy) as indicated and click Submit. You will be taken to the next sign-up page. 5. Create a [employer] ID. This will be your [employer] login ID and cannot be changed, so think of one that is secure and easy to remember. 6. Create a [employer] password. You can change your password at any time. 7. Enter your Password Reset Question and Answer. This can be used at a later time if you forget your password. 8. Enter your e-mail address. You will receive e-mail notification when new information is available in [address] 9. Click Sign Up. You can now view and download portions of your medical record. 10. Click the Download Summary menu link to download a portable copy of your medical information. If you have questions, please visit the Frequently Asked Questions section of the [employer] website. Remember, [employer] is NOT to be used for urgent needs. For medical emergencies, dial 911. Now available from your iPhone and Android! Please provide this summary of care documentation to your next provider. Your primary care clinician is listed as Mindy Herrera. If you have any questions after today's visit, please call [phone].

## 2018-06-25 NOTE — PROGRESS NOTES
Hematology/Oncology  Progress Note    Name: Mayte Bernard  Date: 2018  : 1950    PCP:Khadra Martinez MD      Ms. Josue Alvarez is a 69year old female who was seen for management of her myelodysplastic syndrome/refractory anemia. The patient also has quite severe arthritis. Current therapy: Percocet 10 mg 1 tablet every 6 hours as needed for pain control    Subjective:     Ms. Josue Alvarez is a 59-year-old  woman who has myelodysplastic syndrome with refractory anemia. She also suffers from quite severe arthritis involving her neck, back, wrist and knees. She is ambulating with the use of a cane at this time. She is currently doing phycial therapy. Percocet as needed for her pain. Her appetite and energy level are good. She denies fevers, chest pains, or shortness of breath. She has no additional complaints. The patient is requesting a renewal of her Percocet prescription today. Additionally, the patient reports that over the past several weeks she has noticed recurrent episodes of nosebleed. She denies having any blood in her urine or stool. Currently she is ambulating with the use of a cane. Past medical history, family history, and social history: these were reviewed and remains unchanged. Past Medical History:   Diagnosis Date    Chronic pain     Chronic leg pain    Hypercholesterolemia     Hypertension     Myelodysplastic syndrome, unspecified ([address]     Refractory Anemia      Past Surgical History:   Procedure Laterality Date   Jud Skipper SURGERY  [id number]    Work related back injury     Social History     Social History    Marital status: UNKNOWN     Spouse name: N/A    Number of children: N/A    Years of education: N/A     Occupational History    Not on file.      Social History Main Topics    Smoking status: Current Every Day Smoker     Packs/day: 1.50     Types: Cigarettes    Smokeless tobacco: Current User    Alcohol use Yes      Comment: Occasional - beer    Drug use: Not on file    Sexual activity: Not on file     Other Topics Concern    Not on file     Social History Narrative     No family history on file. Current Outpatient Prescriptions   Medication Sig Dispense Refill    oxyCODONE-acetaminophen (PERCOCET)  mg per tablet Take 1 Tab by mouth every six (6) hours as needed for Pain. Max Daily Amount: 4 Tabs. 120 Tab 0    ergocalciferol (VITAMIN D2) 50,000 unit capsule Take 1 Cap by mouth every seven (7) days. Indications: Vitamin D Deficiency 12 Cap 2    naloxone (NARCAN) 2 mg/actuation spry Use 1 spray intranasally into 1 nostril. Use a new Narcan nasal spray for subsequent doses and administer into alternating nostrils. May repeat every 2 to 3 minutes as needed. Indications: OPIATE-INDUCED RESPIRATORY DEPRESSION, OPIOID TOXICITY 1 Box 0    iron polysaccharides (FERREX 150) 150 mg iron capsule Take 1 Cap by mouth two (2) times a day. 90 Cap 6    ferrous sulfate 325 mg (65 mg iron) tablet Take 1 Tab by mouth Daily (before breakfast). 90 Tab 3    pantoprazole (PROTONIX) 40 mg tablet Take 1 Tab by mouth daily. 30 Tab 1    PROAIR HFA 90 mcg/actuation inhaler       gabapentin (NEURONTIN) 300 mg capsule       cyclobenzaprine (FLEXERIL) 10 mg tablet Take 1 Tab by mouth three (3) times daily as needed for Muscle Spasm(s). 90 Tab 6    benazepril (LOTENSIN) 20 mg tablet Take 20 mg by mouth daily.  pravastatin (PRAVACHOL) 20 mg tablet Take 20 mg by mouth nightly.  amLODIPine (NORVASC) 10 mg tablet Take  by mouth daily. Review of Systems  Constitutional: The patient has complains of pain in the neck due to severe arthritis. HEENT: The patient denies recent head trauma, eye pain, blurred vision,  hearing deficit, oropharyngeal mucosal pain or lesions, and the patient denies throat pain or discomfort. Lymphatics: The patient denies palpable peripheral lymphadenopathy.   Hematologic: The patient denies having bruising, bleeding, or progressive fatigue. Respiratory: Patient denies having shortness of breath, cough, sputum production, fever, or dyspnea on exertion. Cardiovascular: The patient denies having leg pain, leg swelling, heart palpitations, chest permit, chest pain, or lightheadedness. The patient denies having dyspnea on exertion. Gastrointestinal: The patient denies having nausea, emesis, or diarrhea. The patient denies having any hematemesis or blood in the stool. Genitourinary: Patient denies having urinary urgency, frequency, or dysuria. The patient denies having blood in the urine. Psychological: The patient denies having symptoms of nervousness, anxiety, depression, or thoughts of harming himself some of this. Skin: Patient denies having skin rashes, skin, ulcerations, or unexplained itching or pruritus. Musculoskeletal: The patient complains of pain in neck      Objective:     Visit Vitals    /70    Pulse 77    Temp 98.2 °F (36.8 °C)    Wt 89.8 kg (198 lb)    BMI 30.11 kg/m2     Pain 6/10 ECOG 0  Physical Exam:   Gen. Appearance: The patient is in no acute distress. Skin: There is no bruise or rash. HEENT: The exam is unremarkable. Neck: Supple without lymphadenopathy or thyromegaly. Lungs: Clear to auscultation and percussion; there are no wheezes or rhonchi. Heart: Regular rate and rhythm; there are no murmurs, gallops, or rubs. Abdomen: Bowel sounds are present and normal.  There is no guarding, tenderness, or hepatosplenomegaly. Extremities: There is no clubbing, cyanosis, or edema. Neurologic: There are no focal neurologic deficits. Lymphatics: There is no palpable peripheral lymphadenopathy. Musculoskeletal: The patient has full range of motion at all joints. The patient has some deformity in her knee joints bilaterally. She is using a cane for mobility support. Psychological/psychiatric: There is no clinical evidence of anxiety, depression, or melancholy.     Lab data:      Results for orders placed or performed during the hospital encounter of 06/25/18   CBC WITH 3 PART DIFF     Status: Abnormal   Result Value Ref Range Status    WBC 5.8 4.5 - 13.0 K/uL Final    RBC 3.88 (L) 4.10 - 5.10 M/uL Final    HGB 12.6 12.0 - 16 g/dL Final    HCT 37.2 36 - 48 % Final    MCV 95.9 78 - 102 FL Final    MCH 32.5 25.0 - 35.0 PG Final    MCHC 33.9 31 - 37 g/dL Final    RDW 14.8 (H) 11.5 - 14.5 % Final    PLATELET 443 469 - 025 K/uL Final    NEUTROPHILS 75 (H) 40 - 70 % Final    MIXED CELLS 5 0.1 - 17 % Final    LYMPHOCYTES 20 14 - 44 % Final    ABS. NEUTROPHILS 4.3 1.8 - 9.5 K/UL Final    ABS. MIXED CELLS 0.3 0.0 - 2.3 K/uL Final    ABS. LYMPHOCYTES 1.2 1.1 - 5.9 K/UL Final     Comment: Test performed at [address] Results Reviewed by Medical Director. DF AUTOMATED   Final           Assessment:     1. Myelodysplastic syndrome ([facility]    2. Refractory anemia due to myelodysplastic syndrome ([facility]    3. Arthritis    4. Vitamin D deficiency    5. Chronic pain of multiple joints    6. Nosebleed      Plan:     Myelodysplastic syndrome/refractory anemia: I have explained to the patient  that the CBC today revealed that her current hemoglobin is 12.6 g/dL with hematocrit 37.2% we will continue to monitor her at 3 month intervals with the option of providing Procrit if she ever declines below 10 g on a hemoglobin below 30% on the hematocrit. Arthritis(progressive problem): The patient is complaining of progressive pain in the neck. Also back and right wrist and hand. She continues to use a narcotic based pain medication. I will renew her Percocet Rx today. Generally, she receives Percocet 10 mg every 6-8 hours as needed. Vitamin D Deficiency: I have explained to the patient and her vitamin D level on 3/26/2018 38.4 ng/mL. She has previously completed a full course of vitamin D 50,000. I will recheck a vitamin D at this time.     Nosebleed (new problem): I have explained to the patient that the CBC from today shows that her platelet counts are normal at 302,000. She is currently not taking any aspirins or other NSAIDs. Therefore with her history of nosebleeds I will refer her to an ENT specialist to assess for possible vascular malformation or mucosal thickening due to her epistaxis for her    Follow-up will occur in 3 months for a complete reassessment. Orders Placed This Encounter    COMPLETE CBC & AUTO DIFF WBC    InHouse CBC ([employer])     Standing Status:   Future     Number of Occurrences:   1     Standing Expiration Date:   1/8/3351    METABOLIC PANEL, COMPREHENSIVE     Standing Status:   Future     Number of Occurrences:   1     Standing Expiration Date:   6/26/2019    VITAMIN D, 25 HYDROXY     Standing Status:   Future     Number of Occurrences:   1     Standing Expiration Date:   6/26/2019    FERRITIN     Standing Status:   Future     Number of Occurrences:   1     Standing Expiration Date:   6/26/2019    IRON PROFILE     Standing Status:   Future     Number of Occurrences:   1     Standing Expiration Date:   6/26/2019    oxyCODONE-acetaminophen (PERCOCET)  mg per tablet     Sig: Take 1 Tab by mouth every six (6) hours as needed for Pain. Max Daily Amount: 4 Tabs. Dispense:  120 Tab     Refill:  0         Amy De La Rosa NP  6/25/2018     I have assessed the patient independently and  agree with the full assessment as outlined.   Hakan Limon MD, [address]

## 2018-06-25 NOTE — PATIENT INSTRUCTIONS
Myelodysplastic Syndromes: Care Instructions  Your Care Instructions  Myelodysplastic syndromes, also called MDS, are a group of rare conditions in which the bone marrow does not make enough healthy blood cells. Normally, the bone marrow makes red blood cells, white blood cells, and platelets. These cells carry oxygen in the blood, help the body fight infections, and help the blood clot. With MDS, you may feel weak and tired, get infections often, and bruise easily, although symptoms tend to vary. MDS is a form of blood cancer. In some cases, MDS can turn into acute myeloid leukemia (AML), another type of cancer. Some people develop MDS after treatment for cancer or exposure to pesticides or other chemicals. But in most cases, the cause of MDS is not known. Your doctor will use the results of blood tests to guide your treatment. There are many types of MDS, with different treatment plans for each. If you have enough red blood cells and are feeling all right, you may not need active treatment, but you and your doctor will want to watch your condition carefully. If you start feeling lightheaded and have no energy, you may need a blood transfusion. Your doctor also may give you antibiotics to prevent or treat infection. Follow-up care is a key part of your treatment and safety. Be sure to make and go to all appointments, and call your doctor if you are having problems. It's also a good idea to know your test results and keep a list of the medicines you take. How can you care for yourself at home? · Take your medicines exactly as prescribed. Call your doctor if you think you are having a problem with your medicine. You will get more details on the specific medicines your doctor prescribes. · If your doctor prescribed antibiotics, take them as directed. Do not stop taking them just because you feel better. You need to take the full course of antibiotics.  If you have side effects from antibiotics, tell your doctor. · Take steps to control your stress and workload. Learn relaxation techniques. ¨ Share your feelings. Stress and tension affect our emotions. By expressing your feelings to others, you may be able to understand and cope with them. ¨ Consider joining a support group. Talking about a problem with your spouse, a good friend, or other people with similar problems is a good way to reduce tension and stress. ¨ Express yourself with art. Try writing, crafts, dance, or art to relieve stress. ¨ Be kind to your body and mind. Getting enough sleep, eating a healthy diet, and taking time to do things you enjoy can contribute to an overall feeling of balance in your life and help reduce stress. ¨ Get help if you need it. Discuss your concerns with your doctor or counselor. · Do not smoke. Smoking can make blood problems worse. If you need help quitting, talk to your doctor about stop-smoking programs and medicines. These can increase your chances of quitting for good. · If you have not already done so, prepare a list of advance directives. Advance directives are instructions to your doctor and family members about what kind of care you want if you become unable to speak or express yourself. · Call the [facility] (3-970.923.6584) or visit its website at [address]. Identified for more information. When should you call for help? Call 911 anytime you think you may need emergency care. For example, call if:  ? · You passed out (lost consciousness). ?Call your doctor now or seek immediate medical care if:  ? · You have a fever. ? · You have abnormal bleeding. ? · You have new or worse pain. ? · You think you have an infection. ? · You have new symptoms, such as a cough, belly pain, vomiting, diarrhea, or a rash. ? Watch closely for changes in your health, and be sure to contact your doctor if:  ? · You are much more tired than usual.   ? · You have swollen glands in your armpits, groin, or neck.    ? · You do not get better as expected. Where can you learn more? Go to http://marjorie-marianela.info/. Enter Umair Fajardo in the search box to learn more about \"Myelodysplastic Syndromes: Care Instructions. \"  Current as of: May 12, 2017  Content Version: 11.4  © 3529-7835 [employer]. Care instructions adapted under license by [employer] (which disclaims liability or warranty for this information). If you have questions about a medical condition or this instruction, always ask your healthcare professional. [address] any warranty or liability for your use of this information.

## 2018-06-29 LAB
25(OH)D3+25(OH)D2 SERPL-MCNC: 45.5 NG/ML (ref 30–100)
ALBUMIN SERPL-MCNC: 4.3 G/DL (ref 3.6–4.8)
ALBUMIN/GLOB SERPL: 1.5 {RATIO} (ref 1.2–2.2)
ALP SERPL-CCNC: 69 IU/L (ref 39–117)
ALT SERPL-CCNC: 17 IU/L (ref 0–32)
AST SERPL-CCNC: 15 IU/L (ref 0–40)
BILIRUB SERPL-MCNC: <0.2 MG/DL (ref 0–1.2)
BUN SERPL-MCNC: 7 MG/DL (ref 8–27)
BUN/CREAT SERPL: 15 (ref 12–28)
CALCIUM SERPL-MCNC: 9.8 MG/DL (ref 8.7–10.3)
CHLORIDE SERPL-SCNC: 107 MMOL/L (ref 96–106)
CO2 SERPL-SCNC: 21 MMOL/L (ref 20–29)
CREAT SERPL-MCNC: 0.47 MG/DL (ref 0.57–1)
FERRITIN SERPL-MCNC: 139 NG/ML (ref 15–150)
GLOBULIN SER CALC-MCNC: 2.8 G/DL (ref 1.5–4.5)
GLUCOSE SERPL-MCNC: 103 MG/DL (ref 65–99)
IRON SATN MFR SERPL: 20 % (ref 15–55)
IRON SERPL-MCNC: 48 UG/DL (ref 27–139)
POTASSIUM SERPL-SCNC: 4.3 MMOL/L (ref 3.5–5.2)
PROT SERPL-MCNC: 7.1 G/DL (ref 6–8.5)
SODIUM SERPL-SCNC: 144 MMOL/L (ref 134–144)
TIBC SERPL-MCNC: 237 UG/DL (ref 250–450)
UIBC SERPL-MCNC: 189 UG/DL (ref 118–369)

## 2018-07-23 DIAGNOSIS — M25.50 CHRONIC PAIN OF MULTIPLE JOINTS: ICD-10-CM

## 2018-07-23 DIAGNOSIS — D46.9 MYELODYSPLASTIC SYNDROME (HCC): ICD-10-CM

## 2018-07-23 DIAGNOSIS — G89.29 CHRONIC PAIN OF MULTIPLE JOINTS: ICD-10-CM

## 2018-07-23 RX ORDER — OXYCODONE AND ACETAMINOPHEN 10; 325 MG/1; MG/1
1 TABLET ORAL
Qty: 120 TAB | Refills: 0 | Status: CANCELLED | OUTPATIENT
Start: 2018-07-23

## 2018-07-23 RX ORDER — OXYCODONE AND ACETAMINOPHEN 10; 325 MG/1; MG/1
1 TABLET ORAL
Qty: 120 TAB | Refills: 0 | Status: SHIPPED | OUTPATIENT
Start: 2018-07-23 | End: 2018-08-20 | Stop reason: SDUPTHER

## 2018-08-20 DIAGNOSIS — M25.50 CHRONIC PAIN OF MULTIPLE JOINTS: ICD-10-CM

## 2018-08-20 DIAGNOSIS — D46.9 MYELODYSPLASTIC SYNDROME (HCC): ICD-10-CM

## 2018-08-20 DIAGNOSIS — G89.29 CHRONIC PAIN OF MULTIPLE JOINTS: ICD-10-CM

## 2018-08-20 RX ORDER — OXYCODONE AND ACETAMINOPHEN 10; 325 MG/1; MG/1
1 TABLET ORAL
Qty: 120 TAB | Refills: 0 | Status: CANCELLED | OUTPATIENT
Start: 2018-08-20

## 2018-08-20 RX ORDER — OXYCODONE AND ACETAMINOPHEN 10; 325 MG/1; MG/1
1 TABLET ORAL
Qty: 120 TAB | Refills: 0 | Status: SHIPPED | OUTPATIENT
Start: 2018-08-20 | End: 2018-09-18 | Stop reason: SDUPTHER

## 2018-08-24 RX ORDER — ERGOCALCIFEROL 1.25 MG/1
50000 CAPSULE ORAL
Qty: 12 CAP | Refills: 2 | OUTPATIENT
Start: 2018-08-24

## 2018-09-18 DIAGNOSIS — G89.29 CHRONIC PAIN OF MULTIPLE JOINTS: ICD-10-CM

## 2018-09-18 DIAGNOSIS — M25.50 CHRONIC PAIN OF MULTIPLE JOINTS: ICD-10-CM

## 2018-09-18 DIAGNOSIS — D46.9 MYELODYSPLASTIC SYNDROME (HCC): ICD-10-CM

## 2018-09-18 RX ORDER — OXYCODONE AND ACETAMINOPHEN 10; 325 MG/1; MG/1
1 TABLET ORAL
Qty: 120 TAB | Refills: 0 | Status: CANCELLED | OUTPATIENT
Start: 2018-09-18

## 2018-09-18 RX ORDER — OXYCODONE AND ACETAMINOPHEN 10; 325 MG/1; MG/1
1 TABLET ORAL
Qty: 120 TAB | Refills: 0 | Status: SHIPPED | OUTPATIENT
Start: 2018-09-18 | End: 2018-10-17 | Stop reason: SDUPTHER

## 2018-10-03 ENCOUNTER — HOSPITAL ENCOUNTER (OUTPATIENT)
Dept: ONCOLOGY | Age: 68
Discharge: HOME | End: 2018-10-03

## 2018-10-03 ENCOUNTER — OFFICE VISIT (OUTPATIENT)
Dept: ONCOLOGY | Age: 68
End: 2018-10-03

## 2018-10-03 DIAGNOSIS — M25.50 CHRONIC PAIN OF MULTIPLE JOINTS: ICD-10-CM

## 2018-10-03 DIAGNOSIS — D46.9 MYELODYSPLASTIC SYNDROME (HCC): Primary | ICD-10-CM

## 2018-10-03 DIAGNOSIS — D46.9 MYELODYSPLASTIC SYNDROME (HCC): ICD-10-CM

## 2018-10-03 DIAGNOSIS — R04.0 NOSEBLEED: ICD-10-CM

## 2018-10-03 DIAGNOSIS — E55.9 VITAMIN D DEFICIENCY: ICD-10-CM

## 2018-10-03 DIAGNOSIS — D46.4 REFRACTORY ANEMIA DUE TO MYELODYSPLASTIC SYNDROME (HCC): ICD-10-CM

## 2018-10-03 DIAGNOSIS — G89.29 CHRONIC PAIN OF MULTIPLE JOINTS: ICD-10-CM

## 2018-10-03 DIAGNOSIS — M19.90 ARTHRITIS: ICD-10-CM

## 2018-10-03 LAB
BASO+EOS+MONOS # BLD AUTO: 0.2 K/UL (ref 0–2.3)
BASO+EOS+MONOS # BLD AUTO: 4 % (ref 0.1–17)
DIFFERENTIAL METHOD BLD: ABNORMAL
ERYTHROCYTE [DISTWIDTH] IN BLOOD BY AUTOMATED COUNT: 13.9 % (ref 11.5–14.5)
HCT VFR BLD AUTO: 35.7 % (ref 36–48)
HGB BLD-MCNC: 12 G/DL (ref 12–16)
LYMPHOCYTES # BLD: 1.6 K/UL (ref 1.1–5.9)
LYMPHOCYTES NFR BLD: 36 % (ref 14–44)
MCH RBC QN AUTO: 32.2 PG (ref 25–35)
MCHC RBC AUTO-ENTMCNC: 33.6 G/DL (ref 31–37)
MCV RBC AUTO: 95.7 FL (ref 78–102)
NEUTS SEG # BLD: 2.7 K/UL (ref 1.8–9.5)
NEUTS SEG NFR BLD: 60 % (ref 40–70)
PLATELET # BLD AUTO: 270 K/UL (ref 140–440)
RBC # BLD AUTO: 3.73 M/UL (ref 4.1–5.1)
WBC # BLD AUTO: 4.5 K/UL (ref 4.5–13)

## 2018-10-03 NOTE — MR AVS SNAPSHOT
Sheree  
 
 
 [facility] [place] [address] [address] [facility] 
[phone] Patient: Daisha Reza MRN: [id number] MG Visit Information Date & Time Provider Department Dept. Phone Encounter #  
 10/3/2018 11:00 AM Cherri Feliz MD [facility] Medical Oncology [phone] [id number] Follow-up Instructions Return in about 3 months (around 1/3/2019). Your Appointments 2019 10:45 AM  
Office Visit with Cherri Feliz MD  
[facility]  Oncology [facility]) Appt Note: 3 MO RET  
 [address] [place] [address], [address] [place] [address] Upcoming Health Maintenance Date Due Hepatitis C Screening 1950 DTaP/Tdap/Td series (1 - Tdap) 1969 Shingrix Vaccine Age 50> (1 of 2) 1998 BREAST CANCER SCRN MAMMOGRAM 1998 FOBT Q 1 YEAR AGE 50-75 1998 GLAUCOMA SCREENING Q2Y 2013 Bone Densitometry (Dexa) Screening 2013 Pneumococcal 65+ High/Highest Risk (1 of 2 - PCV13) 2013 MEDICARE YEARLY EXAM 3/14/2018 Influenza Age 5 to Adult 2018 Allergies as of 10/3/2018  Review Complete On: 10/3/2018 By: Cherri Feliz MD  
 No Known Allergies Current Immunizations  Never Reviewed No immunizations on file. Not reviewed this visit You Were Diagnosed With   
  
 Codes Comments Myelodysplastic syndrome ([facility]    -  Primary ICD-10-CM: D46.9 ICD-9-CM: 238.75 Refractory anemia due to myelodysplastic syndrome (HCC)     ICD-10-CM: D46.4 ICD-9-CM: 238.72 Vitamin D deficiency     ICD-10-CM: E55.9 ICD-9-CM: 268.9 Chronic pain of multiple joints     ICD-10-CM: M25.50, G89.29 ICD-9-CM: 719.49, 338.29 Nosebleed     ICD-10-CM: R04.0 ICD-9-CM: 784.7  Arthritis     ICD-10-CM: M19.90 
 ICD-9-CM: 716.90 Vitals Smoking Status Current Every Day Smoker Preferred Pharmacy Pharmacy Name Phone [facility], [address] Your Updated Medication List  
  
   
This list is accurate as of 10/3/18 11:22 AM.  Always use your most recent med list. amLODIPine 10 mg tablet Commonly known as:  Lillette Cabot Take  by mouth daily. benazepril 20 mg tablet Commonly known as:  LOTENSIN Take 20 mg by mouth daily. cyclobenzaprine 10 mg tablet Commonly known as:  FLEXERIL Take 1 Tab by mouth three (3) times daily as needed for Muscle Spasm(s). ergocalciferol 50,000 unit capsule Commonly known as:  VITAMIN D2 Take 1 Cap by mouth every seven (7) days. Indications: Vitamin D Deficiency  
  
 ferrous sulfate 325 mg (65 mg iron) tablet Take 1 Tab by mouth Daily (before breakfast). gabapentin 300 mg capsule Commonly known as:  NEURONTIN  
  
 iron polysaccharides 150 mg iron capsule Commonly known as:  FERREX 150 Take 1 Cap by mouth two (2) times a day.  
  
 naloxone 2 mg/actuation Spry Commonly known as:  [employer] Use 1 spray intranasally into 1 nostril. Use a new Narcan nasal spray for subsequent doses and administer into alternating nostrils. May repeat every 2 to 3 minutes as needed. Indications: OPIATE-INDUCED RESPIRATORY DEPRESSION, OPIOID TOXICITY  
  
 oxyCODONE-acetaminophen  mg per tablet Commonly known as:  PERCOCET Take 1 Tab by mouth every six (6) hours as needed for Pain. Max Daily Amount: 4 Tabs. pantoprazole 40 mg tablet Commonly known as:  PROTONIX Take 1 Tab by mouth daily. pravastatin 20 mg tablet Commonly known as:  PRAVACHOL Take 20 mg by mouth nightly. PROAIR HFA 90 mcg/actuation inhaler Generic drug:  albuterol We Performed the Following COMPLETE CBC & AUTO DIFF WBC [11989 CPT(R)] FERRITIN [08735 CPT(R)] IRON PROFILE [id number] CPT(R)] METABOLIC PANEL, COMPREHENSIVE [38933 CPT(R)] VITAMIN D, 25 HYDROXY [id number] CPT(R)] Follow-up Instructions Return in about 3 months (around 1/3/2019). To-Do List   
 10/03/2018 Lab:  CBC WITH 3 PART DIFF Patient Instructions Myelodysplastic Syndromes: Care Instructions Your Care Instructions Myelodysplastic syndromes, also called MDS, are a group of rare conditions in which the bone marrow does not make enough healthy blood cells. Normally, the bone marrow makes red blood cells, white blood cells, and platelets. These cells carry oxygen in the blood, help the body fight infections, and help the blood clot. With MDS, you may feel weak and tired, get infections often, and bruise easily, although symptoms tend to vary. MDS is a form of blood cancer. In some cases, MDS can turn into acute myeloid leukemia (AML), another type of cancer. Some people develop MDS after treatment for cancer or exposure to pesticides or other chemicals. But in most cases, the cause of MDS is not known. Your doctor will use the results of blood tests to guide your treatment. There are many types of MDS, with different treatment plans for each. If you have enough red blood cells and are feeling all right, you may not need active treatment, but you and your doctor will want to watch your condition carefully. If you start feeling lightheaded and have no energy, you may need a blood transfusion. Your doctor also may give you antibiotics to prevent or treat infection. Follow-up care is a key part of your treatment and safety. Be sure to make and go to all appointments, and call your doctor if you are having problems. It's also a good idea to know your test results and keep a list of the medicines you take. How can you care for yourself at home? · Take your medicines exactly as prescribed.  Call your doctor if you think you are having a problem with your medicine. You will get more details on the specific medicines your doctor prescribes. · If your doctor prescribed antibiotics, take them as directed. Do not stop taking them just because you feel better. You need to take the full course of antibiotics. If you have side effects from antibiotics, tell your doctor. · Take steps to control your stress and workload. Learn relaxation techniques. ¨ Share your feelings. Stress and tension affect our emotions. By expressing your feelings to others, you may be able to understand and cope with them. ¨ Consider joining a support group. Talking about a problem with your spouse, a good friend, or other people with similar problems is a good way to reduce tension and stress. ¨ Express yourself with art. Try writing, crafts, dance, or art to relieve stress. ¨ Be kind to your body and mind. Getting enough sleep, eating a healthy diet, and taking time to do things you enjoy can contribute to an overall feeling of balance in your life and help reduce stress. ¨ Get help if you need it. Discuss your concerns with your doctor or counselor. · Do not smoke. Smoking can make blood problems worse. If you need help quitting, talk to your doctor about stop-smoking programs and medicines. These can increase your chances of quitting for good. · If you have not already done so, prepare a list of advance directives. Advance directives are instructions to your doctor and family members about what kind of care you want if you become unable to speak or express yourself. · Call the [employer] (1-490.111.3908) or visit its website at [address] [employer] for more information. When should you call for help? Call 911 anytime you think you may need emergency care. For example, call if: 
  · You passed out (lost consciousness).  
 Call your doctor now or seek immediate medical care if: 
  · You have a fever.  
  · You have abnormal bleeding.   · You have new or worse pain.  
  · You think you have an infection.  
  · You have new symptoms, such as a cough, belly pain, vomiting, diarrhea, or a rash.  
 Watch closely for changes in your health, and be sure to contact your doctor if: 
  · You are much more tired than usual.  
  · You have swollen glands in your armpits, groin, or neck.  
  · You do not get better as expected. Where can you learn more? Go to http://marjorie-marianela.info/. Enter Bertrand Sharma in the search box to learn more about \"Myelodysplastic Syndromes: Care Instructions. \" Current as of: May 12, 2017 Content Version: 11.7 © 4337-9596 [employer]. Care instructions adapted under license by [employer] (which disclaims liability or warranty for this information). If you have questions about a medical condition or this instruction, always ask your healthcare professional. [address] any warranty or liability for your use of this information. Introducing [facility]! Lisset Johansen introduces [employer] patient portal. Now you can access parts of your medical record, email your doctor's office, and request medication refills online. 1. In your internet browser, go to [email or web address] 2. Click on the First Time User? Click Here link in the Sign In box. You will see the New Member Sign Up page. 3. Enter your [employer] Access Code exactly as it appears below. You will not need to use this code after youve completed the sign-up process. If you do not sign up before the expiration date, you must request a new code. · [employer] Access Code: [email or web address] Expires: 1/1/2019 11:21 AM 
 
4. Enter the last four digits of your Social Security Number (xxxx) and Date of Birth (mm/dd/yyyy) as indicated and click Submit. You will be taken to the next sign-up page. 5. Create a [employer] ID.  This will be your [employer] login ID and cannot be changed, so think of one that is secure and easy to remember. 6. Create a [employer] password. You can change your password at any time. 7. Enter your Password Reset Question and Answer. This can be used at a later time if you forget your password. 8. Enter your e-mail address. You will receive e-mail notification when new information is available in [address] 9. Click Sign Up. You can now view and download portions of your medical record. 10. Click the Download Summary menu link to download a portable copy of your medical information. If you have questions, please visit the Frequently Asked Questions section of the [employer] website. Remember, [employer] is NOT to be used for urgent needs. For medical emergencies, dial 911. Now available from your iPhone and Android! Please provide this summary of care documentation to your next provider. Your primary care clinician is listed as Chino Burch. If you have any questions after today's visit, please call [phone].

## 2018-10-03 NOTE — PROGRESS NOTES
Hematology/Oncology  Progress Note    Name: Abi Avelar  Date: 10/3/2018  : 1950    PCP:Khadra Nieves MD      Ms. Jonah Knapp is a 69year old female who was seen for management of her myelodysplastic syndrome/refractory anemia. The patient also has quite severe arthritis. Current therapy: Percocet 10 mg 1 tablet every 6 hours as needed for pain control    Subjective:     Ms. Jonah Knapp is a 25-year-old  woman who has myelodysplastic syndrome with refractory anemia. She also suffers from quite severe arthritis involving her neck, back, wrist and knees. She is ambulating with the use of a cane at this time. She is currently doing phycial therapy. The patient continues to use Percocet as needed for her pain. Her appetite and energy level are good. She denies fevers, chest pains, or shortness of breath. She has no additional complaints. The patient is requesting a renewal of her Percocet prescription today. Additionally, the patient reports that over the past several weeks she has noticed recurrent episodes of nosebleed. She denies having any blood in her urine or stool. Currently she is ambulating with the use of a cane. Past medical history, family history, and social history: these were reviewed and remains unchanged. Past Medical History:   Diagnosis Date    Chronic pain     Chronic leg pain    Hypercholesterolemia     Hypertension     Myelodysplastic syndrome, unspecified ([address]     Refractory Anemia      Past [facility] History:   [id number]    Work related back injury     Social History     Social History    Marital status: UNKNOWN     Spouse name: N/A    Number of children: N/A    Years of education: N/A     Occupational History    Not on file.      Social History Main Topics    Smoking status: Current Every Day Smoker     Packs/day: 1.50     Types: Cigarettes    Smokeless tobacco: Current User    Alcohol use Yes Comment: Occasional - beer    Drug use: Not on file    Sexual activity: Not on file     Other Topics Concern    Not on file     Social History Narrative     No family history on file. Current Outpatient Prescriptions   Medication Sig Dispense Refill    oxyCODONE-acetaminophen (PERCOCET)  mg per tablet Take 1 Tab by mouth every six (6) hours as needed for Pain. Max Daily Amount: 4 Tabs. 120 Tab 0    ergocalciferol (VITAMIN D2) 50,000 unit capsule Take 1 Cap by mouth every seven (7) days. Indications: Vitamin D Deficiency 12 Cap 2    naloxone (NARCAN) 2 mg/actuation spry Use 1 spray intranasally into 1 nostril. Use a new Narcan nasal spray for subsequent doses and administer into alternating nostrils. May repeat every 2 to 3 minutes as needed. Indications: OPIATE-INDUCED RESPIRATORY DEPRESSION, OPIOID TOXICITY 1 Box 0    iron polysaccharides (FERREX 150) 150 mg iron capsule Take 1 Cap by mouth two (2) times a day. 90 Cap 6    ferrous sulfate 325 mg (65 mg iron) tablet Take 1 Tab by mouth Daily (before breakfast). 90 Tab 3    pantoprazole (PROTONIX) 40 mg tablet Take 1 Tab by mouth daily. 30 Tab 1    PROAIR HFA 90 mcg/actuation inhaler       gabapentin (NEURONTIN) 300 mg capsule       cyclobenzaprine (FLEXERIL) 10 mg tablet Take 1 Tab by mouth three (3) times daily as needed for Muscle Spasm(s). 90 Tab 6    benazepril (LOTENSIN) 20 mg tablet Take 20 mg by mouth daily.  pravastatin (PRAVACHOL) 20 mg tablet Take 20 mg by mouth nightly.  amLODIPine (NORVASC) 10 mg tablet Take  by mouth daily. Review of Systems  Constitutional: The patient has complains of pain in the neck due to severe arthritis. HEENT: The patient denies recent head trauma, eye pain, blurred vision,  hearing deficit, oropharyngeal mucosal pain or lesions, and the patient denies throat pain or discomfort. Lymphatics: The patient denies palpable peripheral lymphadenopathy.   Hematologic: The patient denies having bruising, bleeding, or progressive fatigue. Respiratory: Patient denies having shortness of breath, cough, sputum production, fever, or dyspnea on exertion. Cardiovascular: The patient denies having leg pain, leg swelling, heart palpitations, chest permit, chest pain, or lightheadedness. The patient denies having dyspnea on exertion. Gastrointestinal: The patient denies having nausea, emesis, or diarrhea. The patient denies having any hematemesis or blood in the stool. Genitourinary: Patient denies having urinary urgency, frequency, or dysuria. The patient denies having blood in the urine. Psychological: The patient denies having symptoms of nervousness, anxiety, depression, or thoughts of harming himself some of this. Skin: Patient denies having skin rashes, skin, ulcerations, or unexplained itching or pruritus. Musculoskeletal: The patient complains of pain in neck      Objective: There were no vitals taken for this visit. Pain 6/10 ECOG 0  Physical Exam:   Gen. Appearance: The patient is in no acute distress. Skin: There is no bruise or rash. HEENT: The exam is unremarkable. Neck: Supple without lymphadenopathy or thyromegaly. Lungs: Clear to auscultation and percussion; there are no wheezes or rhonchi. Heart: Regular rate and rhythm; there are no murmurs, gallops, or rubs. Abdomen: Bowel sounds are present and normal.  There is no guarding, tenderness, or hepatosplenomegaly. Extremities: There is no clubbing, cyanosis, or edema. Neurologic: There are no focal neurologic deficits. Lymphatics: There is no palpable peripheral lymphadenopathy. Musculoskeletal: The patient has full range of motion at all joints. The patient has some deformity in her knee joints bilaterally. She is using a cane for mobility support. Psychological/psychiatric: There is no clinical evidence of anxiety, depression, or melancholy.     Lab data:      Results for orders placed or performed during the hospital encounter of 06/25/18   CBC WITH 3 PART DIFF     Status: Abnormal   Result Value Ref Range Status    WBC 5.8 4.5 - 13.0 K/uL Final    RBC 3.88 (L) 4.10 - 5.10 M/uL Final    HGB 12.6 12.0 - 16 g/dL Final    HCT 37.2 36 - 48 % Final    MCV 95.9 78 - 102 FL Final    MCH 32.5 25.0 - 35.0 PG Final    MCHC 33.9 31 - 37 g/dL Final    RDW 14.8 (H) 11.5 - 14.5 % Final    PLATELET 382 105 - 133 K/uL Final    NEUTROPHILS 75 (H) 40 - 70 % Final    MIXED CELLS 5 0.1 - 17 % Final    LYMPHOCYTES 20 14 - 44 % Final    ABS. NEUTROPHILS 4.3 1.8 - 9.5 K/UL Final    ABS. MIXED CELLS 0.3 0.0 - 2.3 K/uL Final    ABS. LYMPHOCYTES 1.2 1.1 - 5.9 K/UL Final     Comment: Test performed at [address]. Results Reviewed by Medical Director. DF AUTOMATED   Final           Assessment:     1. Myelodysplastic syndrome ([facility]    2. Refractory anemia due to myelodysplastic syndrome ([facility] [address] [facility]    3. Vitamin D deficiency    4. Chronic pain of multiple joints    5. Nosebleed    6. Arthritis      Plan:     Myelodysplastic syndrome/refractory anemia: I have explained to the patient  that the CBC today revealed that her current hemoglobin is 12 g/dL with hematocrit 35.7 % we will continue to monitor her at 3 month intervals with the option of providing Procrit if she ever declines below 10 g on a hemoglobin below 30% on the hematocrit. Arthritis(progressive problem): The patient is complaining of progressive pain in the neck. Also back and right wrist and hand. She continues to use a narcotic based pain medication. I will renew her Percocet Rx today. Generally, she receives Percocet 10 mg every 6-8 hours as needed. Vitamin D Deficiency: I have explained to the patient and her vitamin D level on 3/26/2018 38.4 ng/mL. She has previously completed a full course of vitamin D 50,000. I will recheck a vitamin D at this time. Nosebleed (resolved problem):  The patient reports that she has not had any further nosebleeds since her last clinic visit. Follow-up will occur in 3 months for a complete reassessment. Orders Placed This Encounter    COMPLETE CBC & AUTO DIFF WBC    InHouse CBC ([employer])     Standing Status:   Future     Number of Occurrences:   1     Standing Expiration Date:   40/04/1963    METABOLIC PANEL, COMPREHENSIVE     Standing Status:   Future     Standing Expiration Date:   10/4/2019    IRON PROFILE     Standing Status:   Future     Standing Expiration Date:   10/4/2019    FERRITIN     Standing Status:   Future     Standing Expiration Date:   10/4/2019    VITAMIN D, 25 HYDROXY     Standing Status:   Future     Standing Expiration Date:   10/4/2019       Boo Golden MD, [place]  10/3/2018

## 2018-10-03 NOTE — PATIENT INSTRUCTIONS
Myelodysplastic Syndromes: Care Instructions  Your Care Instructions  Myelodysplastic syndromes, also called MDS, are a group of rare conditions in which the bone marrow does not make enough healthy blood cells. Normally, the bone marrow makes red blood cells, white blood cells, and platelets. These cells carry oxygen in the blood, help the body fight infections, and help the blood clot. With MDS, you may feel weak and tired, get infections often, and bruise easily, although symptoms tend to vary. MDS is a form of blood cancer. In some cases, MDS can turn into acute myeloid leukemia (AML), another type of cancer. Some people develop MDS after treatment for cancer or exposure to pesticides or other chemicals. But in most cases, the cause of MDS is not known. Your doctor will use the results of blood tests to guide your treatment. There are many types of MDS, with different treatment plans for each. If you have enough red blood cells and are feeling all right, you may not need active treatment, but you and your doctor will want to watch your condition carefully. If you start feeling lightheaded and have no energy, you may need a blood transfusion. Your doctor also may give you antibiotics to prevent or treat infection. Follow-up care is a key part of your treatment and safety. Be sure to make and go to all appointments, and call your doctor if you are having problems. It's also a good idea to know your test results and keep a list of the medicines you take. How can you care for yourself at home? · Take your medicines exactly as prescribed. Call your doctor if you think you are having a problem with your medicine. You will get more details on the specific medicines your doctor prescribes. · If your doctor prescribed antibiotics, take them as directed. Do not stop taking them just because you feel better. You need to take the full course of antibiotics.  If you have side effects from antibiotics, tell your doctor. · Take steps to control your stress and workload. Learn relaxation techniques. ¨ Share your feelings. Stress and tension affect our emotions. By expressing your feelings to others, you may be able to understand and cope with them. ¨ Consider joining a support group. Talking about a problem with your spouse, a good friend, or other people with similar problems is a good way to reduce tension and stress. ¨ Express yourself with art. Try writing, crafts, dance, or art to relieve stress. ¨ Be kind to your body and mind. Getting enough sleep, eating a healthy diet, and taking time to do things you enjoy can contribute to an overall feeling of balance in your life and help reduce stress. ¨ Get help if you need it. Discuss your concerns with your doctor or counselor. · Do not smoke. Smoking can make blood problems worse. If you need help quitting, talk to your doctor about stop-smoking programs and medicines. These can increase your chances of quitting for good. · If you have not already done so, prepare a list of advance directives. Advance directives are instructions to your doctor and family members about what kind of care you want if you become unable to speak or express yourself. · Call the [employer] (8-997.472.9082) or visit its website at [employer] for more information. When should you call for help? Call 911 anytime you think you may need emergency care.  For example, call if:    · You passed out (lost consciousness).    Call your doctor now or seek immediate medical care if:    · You have a fever.     · You have abnormal bleeding.     · You have new or worse pain.     · You think you have an infection.     · You have new symptoms, such as a cough, belly pain, vomiting, diarrhea, or a rash.    Watch closely for changes in your health, and be sure to contact your doctor if:    · You are much more tired than usual.     · You have swollen glands in your armpits, groin, or neck.     · You do not get better as expected. Where can you learn more? Go to http://marjorie-marianela.info/. Enter Yi Osbornedinand in the search box to learn more about \"Myelodysplastic Syndromes: Care Instructions. \"  Current as of: May 12, 2017  Content Version: 11.7  © 3241-4601 [employer]. Care instructions adapted under license by [employer] (which disclaims liability or warranty for this information). If you have questions about a medical condition or this instruction, always ask your healthcare professional. [address] any warranty or liability for your use of this information.

## 2018-10-04 LAB
25(OH)D3+25(OH)D2 SERPL-MCNC: 31.2 NG/ML (ref 30–100)
ALBUMIN SERPL-MCNC: 4.4 G/DL (ref 3.6–4.8)
ALBUMIN/GLOB SERPL: 1.8 {RATIO} (ref 1.2–2.2)
ALP SERPL-CCNC: 72 IU/L (ref 39–117)
ALT SERPL-CCNC: 14 IU/L (ref 0–32)
AST SERPL-CCNC: 16 IU/L (ref 0–40)
BILIRUB SERPL-MCNC: <0.2 MG/DL (ref 0–1.2)
BUN SERPL-MCNC: 9 MG/DL (ref 8–27)
BUN/CREAT SERPL: 15 (ref 12–28)
CALCIUM SERPL-MCNC: 9.5 MG/DL (ref 8.7–10.3)
CHLORIDE SERPL-SCNC: 106 MMOL/L (ref 96–106)
CO2 SERPL-SCNC: 21 MMOL/L (ref 20–29)
CREAT SERPL-MCNC: 0.6 MG/DL (ref 0.57–1)
FERRITIN SERPL-MCNC: 144 NG/ML (ref 15–150)
GLOBULIN SER CALC-MCNC: 2.4 G/DL (ref 1.5–4.5)
GLUCOSE SERPL-MCNC: 105 MG/DL (ref 65–99)
IRON SATN MFR SERPL: 24 % (ref 15–55)
IRON SERPL-MCNC: 57 UG/DL (ref 27–139)
POTASSIUM SERPL-SCNC: 4.3 MMOL/L (ref 3.5–5.2)
PROT SERPL-MCNC: 6.8 G/DL (ref 6–8.5)
SODIUM SERPL-SCNC: 144 MMOL/L (ref 134–144)
TIBC SERPL-MCNC: 238 UG/DL (ref 250–450)
UIBC SERPL-MCNC: 181 UG/DL (ref 118–369)

## 2018-10-05 ENCOUNTER — TELEPHONE (OUTPATIENT)
Dept: ONCOLOGY | Age: 68
End: 2018-10-05

## 2018-10-16 DIAGNOSIS — G89.29 CHRONIC PAIN OF MULTIPLE JOINTS: ICD-10-CM

## 2018-10-16 DIAGNOSIS — M25.50 CHRONIC PAIN OF MULTIPLE JOINTS: ICD-10-CM

## 2018-10-16 DIAGNOSIS — D46.9 MYELODYSPLASTIC SYNDROME (HCC): ICD-10-CM

## 2018-10-17 ENCOUNTER — DOCUMENTATION ONLY (OUTPATIENT)
Dept: ONCOLOGY | Age: 68
End: 2018-10-17

## 2018-10-17 DIAGNOSIS — D46.9 MYELODYSPLASTIC SYNDROME (HCC): ICD-10-CM

## 2018-10-17 DIAGNOSIS — G89.29 CHRONIC PAIN OF MULTIPLE JOINTS: ICD-10-CM

## 2018-10-17 DIAGNOSIS — M25.50 CHRONIC PAIN OF MULTIPLE JOINTS: ICD-10-CM

## 2018-10-17 RX ORDER — OXYCODONE AND ACETAMINOPHEN 10; 325 MG/1; MG/1
1 TABLET ORAL
Qty: 120 TAB | Refills: 0 | Status: SHIPPED | OUTPATIENT
Start: 2018-10-17 | End: 2018-11-13 | Stop reason: SDUPTHER

## 2018-10-22 RX ORDER — OXYCODONE AND ACETAMINOPHEN 10; 325 MG/1; MG/1
1 TABLET ORAL
Qty: 120 TAB | Refills: 0 | OUTPATIENT
Start: 2018-10-22

## 2018-11-13 DIAGNOSIS — M25.50 CHRONIC PAIN OF MULTIPLE JOINTS: ICD-10-CM

## 2018-11-13 DIAGNOSIS — G89.29 CHRONIC PAIN OF MULTIPLE JOINTS: ICD-10-CM

## 2018-11-13 DIAGNOSIS — D46.9 MYELODYSPLASTIC SYNDROME (HCC): ICD-10-CM

## 2018-11-13 RX ORDER — OXYCODONE AND ACETAMINOPHEN 10; 325 MG/1; MG/1
1 TABLET ORAL
Qty: 120 TAB | Refills: 0 | Status: SHIPPED | OUTPATIENT
Start: 2018-11-13 | End: 2018-11-13 | Stop reason: SDUPTHER

## 2018-11-13 RX ORDER — OXYCODONE AND ACETAMINOPHEN 10; 325 MG/1; MG/1
1 TABLET ORAL
Qty: 120 TAB | Refills: 0 | Status: SHIPPED | OUTPATIENT
Start: 2018-11-13 | End: 2018-12-12 | Stop reason: SDUPTHER

## 2018-12-12 DIAGNOSIS — G89.29 CHRONIC PAIN OF MULTIPLE JOINTS: ICD-10-CM

## 2018-12-12 DIAGNOSIS — M25.50 CHRONIC PAIN OF MULTIPLE JOINTS: ICD-10-CM

## 2018-12-12 DIAGNOSIS — D46.9 MYELODYSPLASTIC SYNDROME (HCC): ICD-10-CM

## 2018-12-12 RX ORDER — OXYCODONE AND ACETAMINOPHEN 10; 325 MG/1; MG/1
1 TABLET ORAL
Qty: 120 TAB | Refills: 0 | Status: SHIPPED | OUTPATIENT
Start: 2018-12-12 | End: 2019-01-09 | Stop reason: SDUPTHER

## 2019-01-09 ENCOUNTER — OFFICE VISIT (OUTPATIENT)
Dept: ONCOLOGY | Age: 69
End: 2019-01-09

## 2019-01-09 ENCOUNTER — HOSPITAL ENCOUNTER (OUTPATIENT)
Dept: ONCOLOGY | Age: 69
Discharge: HOME | End: 2019-01-09

## 2019-01-09 ENCOUNTER — HOSPITAL ENCOUNTER (OUTPATIENT)
Dept: LAB | Age: 69
Discharge: HOME | End: 2019-01-09

## 2019-01-09 VITALS
TEMPERATURE: 98.5 F | RESPIRATION RATE: 16 BRPM | HEIGHT: 68 IN | OXYGEN SATURATION: 98 % | SYSTOLIC BLOOD PRESSURE: 157 MMHG | BODY MASS INDEX: 30.01 KG/M2 | DIASTOLIC BLOOD PRESSURE: 68 MMHG | WEIGHT: 198 LBS | HEART RATE: 82 BPM

## 2019-01-09 DIAGNOSIS — D46.9 MYELODYSPLASTIC SYNDROME (HCC): Primary | ICD-10-CM

## 2019-01-09 DIAGNOSIS — D46.9 MYELODYSPLASTIC SYNDROME (HCC): ICD-10-CM

## 2019-01-09 DIAGNOSIS — M25.50 CHRONIC PAIN OF MULTIPLE JOINTS: ICD-10-CM

## 2019-01-09 DIAGNOSIS — G89.29 CHRONIC PAIN OF MULTIPLE JOINTS: ICD-10-CM

## 2019-01-09 DIAGNOSIS — E55.9 VITAMIN D DEFICIENCY: ICD-10-CM

## 2019-01-09 LAB
BASO+EOS+MONOS # BLD AUTO: 0.3 K/UL (ref 0–2.3)
BASO+EOS+MONOS NFR BLD AUTO: 7 % (ref 0.1–17)
DIFFERENTIAL METHOD BLD: ABNORMAL
ERYTHROCYTE [DISTWIDTH] IN BLOOD BY AUTOMATED COUNT: 14.3 % (ref 11.5–14.5)
HCT VFR BLD AUTO: 36.2 % (ref 36–48)
HGB BLD-MCNC: 12.2 G/DL (ref 12–16)
LYMPHOCYTES # BLD: 1.7 K/UL (ref 1.1–5.9)
LYMPHOCYTES NFR BLD: 33 % (ref 14–44)
MCH RBC QN AUTO: 32.4 PG (ref 25–35)
MCHC RBC AUTO-ENTMCNC: 33.7 G/DL (ref 31–37)
MCV RBC AUTO: 96 FL (ref 78–102)
NEUTS SEG # BLD: 3.1 K/UL (ref 1.8–9.5)
NEUTS SEG NFR BLD: 60 % (ref 40–70)
PLATELET # BLD AUTO: 274 K/UL (ref 140–440)
RBC # BLD AUTO: 3.77 M/UL (ref 4.1–5.1)
WBC # BLD AUTO: 5.1 K/UL (ref 4.5–13)

## 2019-01-09 PROCEDURE — 99001 SPECIMEN HANDLING PT-LAB: CPT

## 2019-01-09 RX ORDER — OXYCODONE AND ACETAMINOPHEN 10; 325 MG/1; MG/1
1 TABLET ORAL
Qty: 120 TAB | Refills: 0 | Status: SHIPPED | OUTPATIENT
Start: 2019-01-09 | End: 2019-02-11 | Stop reason: SDUPTHER

## 2019-01-09 NOTE — PROGRESS NOTES
Hematology/Oncology  Progress Note Name: Racheal Hernandez Date: 2019 : 1950 Nicola Eisenmenger, MD   
 
Ms. Schuyler Baltazar is a 77year old female who was seen for management of her myelodysplastic syndrome/refractory anemia. The patient also has quite severe arthritis. Current therapy: Percocet 10 mg 1 tablet every 6 hours as needed for pain control Subjective:  
 
Ms. Schuyler Baltazar is a 75-year-old [facility] American woman, who is being seen in the office today for a routine follow up for her myelodysplastic syndrome with refractory anemia. She reports that she started eating beets with almost every meal and hopes that her lab work has improved because of this. She also has severe arthritis involving her neck, back, wrist and knees. She rates her pain 8/10 today primarily in her left hand, and her right knee. The patient continues to use Percocet as needed for her pain and is requesting a new prescription today. She is ambulating with the use of a cane at this time. She is currently doing phycial therapy. Her appetite and energy level are good. She denies fevers, chest pains, or shortness of breath. She has no additional complaints or concerns. She denies having any blood in her urine or stool. Currently she is ambulating with the use of a cane. Past medical history, family history, and social history: these were reviewed and remains unchanged. Past Medical History:  
Diagnosis Date  Chronic pain Chronic leg pain  Hypercholesterolemia  Hypertension  Myelodysplastic syndrome, unspecified ([address]  Refractory Anemia Past Surgical History:  
Procedure Laterality Date [facility] SURGERY  [id number] Work related back injury Social History Socioeconomic History  Marital status:  Spouse name: Not on file  Number of children: Not on file  Years of education: Not on file  Highest education level: Not on file Social Needs  Financial resource strain: Not on file  Food insecurity - worry: Not on file  Food insecurity - inability: Not on file  Transportation needs - medical: Not on file  Transportation needs - non-medical: Not on file Occupational History  Not on file Tobacco Use  Smoking status: Current Every Day Smoker Packs/day: 1.50 Types: Cigarettes  Smokeless tobacco: Current User Substance and Sexual Activity  Alcohol use: Yes Comment: Occasional - beer  Drug use: Not on file  Sexual activity: Not on file Other Topics Concern  Not on file Social History Narrative  Not on file History reviewed. No pertinent family history. Current Outpatient Medications Medication Sig Dispense Refill  oxyCODONE-acetaminophen (PERCOCET)  mg per tablet Take 1 Tab by mouth every six (6) hours as needed for Pain. Max Daily Amount: 4 Tabs. 120 Tab 0  
 ergocalciferol (VITAMIN D2) 50,000 unit capsule Take 1 Cap by mouth every seven (7) days. Indications: Vitamin D Deficiency 12 Cap 2  
 naloxone (NARCAN) 2 mg/actuation spry Use 1 spray intranasally into 1 nostril. Use a new Narcan nasal spray for subsequent doses and administer into alternating nostrils. May repeat every 2 to 3 minutes as needed. Indications: OPIATE-INDUCED RESPIRATORY DEPRESSION, OPIOID TOXICITY 1 Box 0  
 iron polysaccharides (FERREX 150) 150 mg iron capsule Take 1 Cap by mouth two (2) times a day. 90 Cap 6  ferrous sulfate 325 mg (65 mg iron) tablet Take 1 Tab by mouth Daily (before breakfast). 90 Tab 3  pantoprazole (PROTONIX) 40 mg tablet Take 1 Tab by mouth daily. 30 Tab 1  
 PROAIR HFA 90 mcg/actuation inhaler  gabapentin (NEURONTIN) 300 mg capsule  cyclobenzaprine (FLEXERIL) 10 mg tablet Take 1 Tab by mouth three (3) times daily as needed for Muscle Spasm(s). 90 Tab 6  
 benazepril (LOTENSIN) 20 mg tablet Take 20 mg by mouth daily.  pravastatin (PRAVACHOL) 20 mg tablet Take 20 mg by mouth nightly.  amLODIPine (NORVASC) 10 mg tablet Take  by mouth daily. Review of Systems Constitutional: The patient has complains of pain in the neck due to severe arthritis. HEENT: The patient denies recent head trauma, eye pain, blurred vision,  hearing deficit, oropharyngeal mucosal pain or lesions, and the patient denies throat pain or discomfort. Lymphatics: The patient denies palpable peripheral lymphadenopathy. Hematologic: The patient denies having bruising, bleeding, or progressive fatigue. Respiratory: Patient denies having shortness of breath, cough, sputum production, fever, or dyspnea on exertion. Cardiovascular: The patient denies having leg pain, leg swelling, heart palpitations, chest permit, chest pain, or lightheadedness. The patient denies having dyspnea on exertion. Gastrointestinal: The patient denies having nausea, emesis, or diarrhea. The patient denies having any hematemesis or blood in the stool. Genitourinary: Patient denies having urinary urgency, frequency, or dysuria. The patient denies having blood in the urine. Psychological: The patient denies having symptoms of nervousness, anxiety, depression, or thoughts of harming himself some of this. Skin: Patient denies having skin rashes, skin, ulcerations, or unexplained itching or pruritus. Musculoskeletal: The patient complains of in her right knee, and her left hand. Objective:  
 
Visit Vitals /68 Pulse 82 Temp 98.5 °F (36.9 °C) (Oral) Resp 16 Ht 5' 8\" (1.727 m) Wt 89.8 kg (198 lb) SpO2 98% BMI 30.11 kg/m² Pain 8/10 ECOG 0 Physical Exam:  
Gen. Appearance: The patient is in no acute distress. Skin: There is no bruise or rash. HEENT: The exam is unremarkable. Neck: Supple without lymphadenopathy or thyromegaly. Lungs: Clear to auscultation and percussion; there are no wheezes or rhonchi.   Heart: Regular rate and rhythm; there are no murmurs, gallops, or rubs. Abdomen: Bowel sounds are present and normal.  There is no guarding, tenderness, or hepatosplenomegaly. Extremities: There is no clubbing, cyanosis, or edema. Neurologic: There are no focal neurologic deficits. Lymphatics: There is no palpable peripheral lymphadenopathy. Musculoskeletal: Joint stiffness in her left hand. The patient has some deformity in her knee joints bilaterally. She is using a cane for mobility support. Psychological/psychiatric: There is no clinical evidence of anxiety, depression, or melancholy. Lab data: 
   
Results for orders placed or performed during the hospital encounter of 01/09/19 CBC WITH 3 PART DIFF     Status: Abnormal  
Result Value Ref Range Status WBC 5.1 4.5 - 13.0 K/uL Final  
 RBC 3.77 (L) 4.10 - 5.10 M/uL Final  
 HGB 12.2 12.0 - 16 g/dL Final  
 HCT 36.2 36 - 48 % Final  
 MCV 96.0 78 - 102 FL Final  
 MCH 32.4 25.0 - 35.0 PG Final  
 MCHC 33.7 31 - 37 g/dL Final  
 RDW 14.3 11.5 - 14.5 % Final  
 PLATELET 176 479 - 314 K/uL Final  
 NEUTROPHILS 60 40 - 70 % Final  
 MIXED CELLS 7 0.1 - 17 % Final  
 LYMPHOCYTES 33 14 - 44 % Final  
 ABS. NEUTROPHILS 3.1 1.8 - 9.5 K/UL Final  
 ABS. MIXED CELLS 0.3 0.0 - 2.3 K/uL Final  
 ABS. LYMPHOCYTES 1.7 1.1 - 5.9 K/UL Final  
  Comment: Test performed at [place] [address] Location. Results Reviewed by Medical Director. DF AUTOMATED   Final  
 
 
   
Assessment:  
 
1. Myelodysplastic syndrome ([facility] [address] 2. Chronic pain of multiple joints 3. Vitamin D deficiency Plan: Myelodysplastic syndrome/refractory anemia: I have explained to the patient that her CBC today shows that her hemoglobin is 12.2 g/dL and her hematocrit is 36.2 %. We will continue to monitor her H/H every 3 months and the patient will be offered Procrit if her Hgb and Hct declines below 10 g and 30%. Arthritis(progressive problem):  The patient continues to complain of severe arthritic pain. Today the pain is primarily in her right knee with joint stiffness and pain in her left hand. This morning she rates her pain 8/10 after taking her pain medication. Her prescription for Percocet 10 mg was renewed today. Vitamin D Deficiency: The patient was informed that her Vitamin D on 10/04/2018 was normal at 31.2 ng/mL. She has previously completed a full course of vitamin D 50,000. I will recheck a vitamin D at this time. She was advised to follow up in 3 months. Orders Placed This Encounter  COMPLETE CBC & AUTO DIFF WBC  InHouse CBC ([employer] Standing Status:   Future Number of Occurrences:   1 Standing Expiration Date:   1/16/2019  CA 27.29 Standing Status:   Future Number of Occurrences:   1 Standing Expiration Date:   1/10/2020  VITAMIN D, 25 HYDROXY Standing Status:   Future Number of Occurrences:   1 Standing Expiration Date:   1/10/2020  METABOLIC PANEL, COMPREHENSIVE Standing Status:   Future Number of Occurrences:   1 Standing Expiration Date:   1/10/2020  
 oxyCODONE-acetaminophen (PERCOCET)  mg per tablet Sig: Take 1 Tab by mouth every six (6) hours as needed for Pain. Max Daily Amount: 4 Tabs. Dispense:  120 Tab Refill:  0 Que Jarquin MD, [address] 
1/9/2019

## 2019-01-10 LAB — SPECIMEN STATUS REPORT, ROLRST: NORMAL

## 2019-01-11 LAB
25(OH)D3+25(OH)D2 SERPL-MCNC: 24.4 NG/ML (ref 30–100)
ALBUMIN SERPL-MCNC: 4.6 G/DL (ref 3.5–4.8)
ALBUMIN/GLOB SERPL: 1.7 {RATIO} (ref 1.2–2.2)
ALP SERPL-CCNC: 84 IU/L (ref 39–117)
ALT SERPL-CCNC: 14 IU/L (ref 0–32)
AST SERPL-CCNC: 14 IU/L (ref 0–40)
BILIRUB SERPL-MCNC: <0.2 MG/DL (ref 0–1.2)
BUN SERPL-MCNC: 7 MG/DL (ref 8–27)
BUN/CREAT SERPL: 15 (ref 12–28)
CALCIUM SERPL-MCNC: 9.6 MG/DL (ref 8.7–10.3)
CANCER AG27-29 SERPL-ACNC: 12.5 U/ML (ref 0–38.6)
CHLORIDE SERPL-SCNC: 108 MMOL/L (ref 96–106)
CO2 SERPL-SCNC: 24 MMOL/L (ref 20–29)
CREAT SERPL-MCNC: 0.46 MG/DL (ref 0.57–1)
GLOBULIN SER CALC-MCNC: 2.7 G/DL (ref 1.5–4.5)
GLUCOSE SERPL-MCNC: 102 MG/DL (ref 65–99)
POTASSIUM SERPL-SCNC: 4.4 MMOL/L (ref 3.5–5.2)
PROT SERPL-MCNC: 7.3 G/DL (ref 6–8.5)
SODIUM SERPL-SCNC: 145 MMOL/L (ref 134–144)
SPECIMEN STATUS REPORT, ROLRST: NORMAL

## 2019-01-14 RX ORDER — ERGOCALCIFEROL 1.25 MG/1
50000 CAPSULE ORAL
Qty: 12 CAP | Refills: 2 | Status: SHIPPED | OUTPATIENT
Start: 2019-01-14 | End: 2020-01-24 | Stop reason: SDUPTHER

## 2019-02-11 DIAGNOSIS — D46.9 MYELODYSPLASTIC SYNDROME (HCC): ICD-10-CM

## 2019-02-11 DIAGNOSIS — M25.50 CHRONIC PAIN OF MULTIPLE JOINTS: ICD-10-CM

## 2019-02-11 DIAGNOSIS — G89.29 CHRONIC PAIN OF MULTIPLE JOINTS: ICD-10-CM

## 2019-02-11 RX ORDER — OXYCODONE AND ACETAMINOPHEN 10; 325 MG/1; MG/1
1 TABLET ORAL
Qty: 120 TAB | Refills: 0 | Status: SHIPPED | OUTPATIENT
Start: 2019-02-11 | End: 2019-03-11 | Stop reason: SDUPTHER

## 2019-02-11 RX ORDER — OXYCODONE AND ACETAMINOPHEN 10; 325 MG/1; MG/1
1 TABLET ORAL
Qty: 120 TAB | Refills: 0 | Status: CANCELLED | OUTPATIENT
Start: 2019-02-11

## 2019-03-11 DIAGNOSIS — M25.50 CHRONIC PAIN OF MULTIPLE JOINTS: ICD-10-CM

## 2019-03-11 DIAGNOSIS — G89.29 CHRONIC PAIN OF MULTIPLE JOINTS: ICD-10-CM

## 2019-03-11 DIAGNOSIS — D46.9 MYELODYSPLASTIC SYNDROME (HCC): ICD-10-CM

## 2019-03-11 RX ORDER — OXYCODONE AND ACETAMINOPHEN 10; 325 MG/1; MG/1
1 TABLET ORAL
Qty: 120 TAB | Refills: 0 | Status: CANCELLED | OUTPATIENT
Start: 2019-03-11

## 2019-03-11 RX ORDER — OXYCODONE AND ACETAMINOPHEN 10; 325 MG/1; MG/1
1 TABLET ORAL
Qty: 120 TAB | Refills: 0 | Status: SHIPPED | OUTPATIENT
Start: 2019-03-11 | End: 2019-04-10 | Stop reason: SDUPTHER

## 2019-04-10 ENCOUNTER — HOSPITAL ENCOUNTER (OUTPATIENT)
Dept: ONCOLOGY | Age: 69
Discharge: HOME | End: 2019-04-10

## 2019-04-10 ENCOUNTER — OFFICE VISIT (OUTPATIENT)
Dept: ONCOLOGY | Age: 69
End: 2019-04-10

## 2019-04-10 VITALS
DIASTOLIC BLOOD PRESSURE: 77 MMHG | RESPIRATION RATE: 16 BRPM | SYSTOLIC BLOOD PRESSURE: 159 MMHG | HEART RATE: 74 BPM | BODY MASS INDEX: 30.01 KG/M2 | HEIGHT: 68 IN | WEIGHT: 198 LBS | TEMPERATURE: 98.8 F

## 2019-04-10 DIAGNOSIS — M25.50 CHRONIC PAIN OF MULTIPLE JOINTS: ICD-10-CM

## 2019-04-10 DIAGNOSIS — E55.9 VITAMIN D DEFICIENCY: ICD-10-CM

## 2019-04-10 DIAGNOSIS — Z91.09 POLLEN ALLERGY: ICD-10-CM

## 2019-04-10 DIAGNOSIS — D46.4 REFRACTORY ANEMIA DUE TO MYELODYSPLASTIC SYNDROME (HCC): ICD-10-CM

## 2019-04-10 DIAGNOSIS — D46.9 MYELODYSPLASTIC SYNDROME (HCC): Primary | ICD-10-CM

## 2019-04-10 DIAGNOSIS — G89.29 CHRONIC PAIN OF MULTIPLE JOINTS: ICD-10-CM

## 2019-04-10 DIAGNOSIS — D46.9 MYELODYSPLASTIC SYNDROME (HCC): ICD-10-CM

## 2019-04-10 LAB
BASO+EOS+MONOS # BLD AUTO: 0.4 K/UL (ref 0–2.3)
BASO+EOS+MONOS NFR BLD AUTO: 9 % (ref 0.1–17)
DIFFERENTIAL METHOD BLD: ABNORMAL
ERYTHROCYTE [DISTWIDTH] IN BLOOD BY AUTOMATED COUNT: 14.7 % (ref 11.5–14.5)
HCT VFR BLD AUTO: 35.4 % (ref 36–48)
HGB BLD-MCNC: 11.9 G/DL (ref 12–16)
LYMPHOCYTES # BLD: 1.6 K/UL (ref 1.1–5.9)
LYMPHOCYTES NFR BLD: 32 % (ref 14–44)
MCH RBC QN AUTO: 32.5 PG (ref 25–35)
MCHC RBC AUTO-ENTMCNC: 33.6 G/DL (ref 31–37)
MCV RBC AUTO: 96.7 FL (ref 78–102)
NEUTS SEG # BLD: 2.9 K/UL (ref 1.8–9.5)
NEUTS SEG NFR BLD: 60 % (ref 40–70)
PLATELET # BLD AUTO: 293 K/UL (ref 140–440)
RBC # BLD AUTO: 3.66 M/UL (ref 4.1–5.1)
WBC # BLD AUTO: 4.9 K/UL (ref 4.5–13)

## 2019-04-10 RX ORDER — OXYCODONE AND ACETAMINOPHEN 10; 325 MG/1; MG/1
1 TABLET ORAL
Qty: 120 TAB | Refills: 0 | Status: SHIPPED | OUTPATIENT
Start: 2019-04-10 | End: 2019-05-07 | Stop reason: SDUPTHER

## 2019-04-10 NOTE — PATIENT INSTRUCTIONS
Myelodysplastic Syndromes: Care Instructions  Your Care Instructions  Myelodysplastic syndromes, also called MDS, are a group of rare conditions in which the bone marrow does not make enough healthy blood cells. Normally, the bone marrow makes red blood cells, white blood cells, and platelets. These cells carry oxygen in the blood, help the body fight infections, and help the blood clot. With MDS, you may feel weak and tired, get infections often, and bruise easily, although symptoms tend to vary. MDS is a form of blood cancer. In some cases, MDS can turn into acute myeloid leukemia (AML), another type of cancer. Some people develop MDS after treatment for cancer or exposure to pesticides or other chemicals. But in most cases, the cause of MDS is not known. Your doctor will use the results of blood tests to guide your treatment. There are many types of MDS, with different treatment plans for each. If you have enough red blood cells and are feeling all right, you may not need active treatment, but you and your doctor will want to watch your condition carefully. If you start feeling lightheaded and have no energy, you may need a blood transfusion. Your doctor also may give you antibiotics to prevent or treat infection. Follow-up care is a key part of your treatment and safety. Be sure to make and go to all appointments, and call your doctor if you are having problems. It's also a good idea to know your test results and keep a list of the medicines you take. How can you care for yourself at home? · Take your medicines exactly as prescribed. Call your doctor if you think you are having a problem with your medicine. You will get more details on the specific medicines your doctor prescribes. · If your doctor prescribed antibiotics, take them as directed. Do not stop taking them just because you feel better. You need to take the full course of antibiotics.  If you have side effects from antibiotics, tell your doctor. · Take steps to control your stress and workload. Learn relaxation techniques. ? Share your feelings. Stress and tension affect our emotions. By expressing your feelings to others, you may be able to understand and cope with them. ? Consider joining a support group. Talking about a problem with your spouse, a good friend, or other people with similar problems is a good way to reduce tension and stress. ? Express yourself with art. Try writing, crafts, dance, or art to relieve stress. ? Be kind to your body and mind. Getting enough sleep, eating a healthy diet, and taking time to do things you enjoy can contribute to an overall feeling of balance in your life and help reduce stress. ? Get help if you need it. Discuss your concerns with your doctor or counselor. · Do not smoke. Smoking can make blood problems worse. If you need help quitting, talk to your doctor about stop-smoking programs and medicines. These can increase your chances of quitting for good. · If you have not already done so, prepare a list of advance directives. Advance directives are instructions to your doctor and family members about what kind of care you want if you become unable to speak or express yourself. · Call the [employer] (7-931.248.5686) or visit its website at [email or web address] for more information. When should you call for help? Call 911 anytime you think you may need emergency care.  For example, call if:    · You passed out (lost consciousness).    Call your doctor now or seek immediate medical care if:    · You have a fever.     · You have abnormal bleeding.     · You have new or worse pain.     · You think you have an infection.     · You have new symptoms, such as a cough, belly pain, vomiting, diarrhea, or a rash.    Watch closely for changes in your health, and be sure to contact your doctor if:    · You are much more tired than usual.     · You have swollen glands in your armpits, groin, or neck.     · You do not get better as expected. Where can you learn more? Go to http://marjorie-marianela.info/. Enter Renato Valenzuela in the search box to learn more about \"Myelodysplastic Syndromes: Care Instructions. \"  Current as of: March 27, 2018  Content Version: 11.9  © 1431-4462 [employer]. Care instructions adapted under license by [employer] (which disclaims liability or warranty for this information). If you have questions about a medical condition or this instruction, always ask your healthcare professional. [address] any warranty or liability for your use of this information.

## 2019-04-10 NOTE — PROGRESS NOTES
Hematology/Oncology  Progress Note    Name: Merlin Meyer  Date: 4/10/2019  : 1950    PCP:Khadra So MD      Ms. Tank Donis is a 77year old female who was seen for management of her myelodysplastic syndrome/refractory anemia. The patient also has quite severe arthritis. Current therapy: Percocet 10 mg 1 tablet every 6 hours as needed for pain control    Subjective:     Ms. Tank Donis is a 58-year-old [facility] American woman, who is being seen in the office today for a routine follow up for her myelodysplastic syndrome with refractory anemia. She reports that she started eating beets with almost every meal and hopes that her lab work has improved because of this. She also has severe arthritis involving her neck, back, wrist and knees. She rates her pain 8/10 today primarily in her left hand, and her right knee. The patient continues to use Percocet as needed for her pain and is requesting a new prescription today. She is ambulating with the use of a cane at this time. She is currently doing phycial therapy. Her appetite and energy level are good. She denies fevers, chest pains, or shortness of breath. She has no additional complaints or concerns. She denies having any blood in her urine or stool. Today she is experiencing significant allergy symptoms of watery eyes, runny nose, and is sneezing. Past medical history, family history, and social history: these were reviewed and remains unchanged.       Past Medical History:   Diagnosis Date    Chronic pain     Chronic leg pain    Hypercholesterolemia     Hypertension     Myelodysplastic syndrome, unspecified ([address]     Refractory Anemia      Past Surgical History:   Procedure Laterality Date   [facility] SURGERY  ,    Work related back injury     Social History     Socioeconomic History    Marital status:      Spouse name: Not on file    Number of children: Not on file    Years of education: Not on file    Highest education level: Not on file   Occupational History    Not on file   Social Needs    Financial resource strain: Not on file    Food insecurity:     Worry: Not on file     Inability: Not on file    Transportation needs:     Medical: Not on file     Non-medical: Not on file   Tobacco Use    Smoking status: Current Every Day Smoker     Packs/day: 1.50     Types: Cigarettes    Smokeless tobacco: Current User   Substance and Sexual Activity    Alcohol use: Yes     Comment: Occasional - beer    Drug use: Not on file    Sexual activity: Not on file   Lifestyle    Physical activity:     Days per week: Not on file     Minutes per session: Not on file    Stress: Not on file   Relationships    Social connections:     Talks on phone: Not on file     Gets together: Not on file     Attends Restorationism service: Not on file     Active member of club or organization: Not on file     Attends meetings of clubs or organizations: Not on file     Relationship status: Not on file    Intimate partner violence:     Fear of current or ex partner: Not on file     Emotionally abused: Not on file     Physically abused: Not on file     Forced sexual activity: Not on file   Other Topics Concern    Not on file   Social History Narrative    Not on file     History reviewed. No pertinent family history. Current Outpatient Medications   Medication Sig Dispense Refill    oxyCODONE-acetaminophen (PERCOCET)  mg per tablet Take 1 Tab by mouth every six (6) hours as needed for Pain for up to 30 days. Max Daily Amount: 4 Tabs. 120 Tab 0    ergocalciferol (VITAMIN D2) 50,000 unit capsule Take 1 Cap by mouth every seven (7) days. 12 Cap 2    naloxone (NARCAN) 2 mg/actuation spry Use 1 spray intranasally into 1 nostril. Use a new Narcan nasal spray for subsequent doses and administer into alternating nostrils. May repeat every 2 to 3 minutes as needed.   Indications: OPIATE-INDUCED RESPIRATORY DEPRESSION, OPIOID TOXICITY 1 Box 0    iron polysaccharides (FERREX 150) 150 mg iron capsule Take 1 Cap by mouth two (2) times a day. 90 Cap 6    ferrous sulfate 325 mg (65 mg iron) tablet Take 1 Tab by mouth Daily (before breakfast). 90 Tab 3    pantoprazole (PROTONIX) 40 mg tablet Take 1 Tab by mouth daily. 30 Tab 1    PROAIR HFA 90 mcg/actuation inhaler       gabapentin (NEURONTIN) 300 mg capsule       cyclobenzaprine (FLEXERIL) 10 mg tablet Take 1 Tab by mouth three (3) times daily as needed for Muscle Spasm(s). 90 Tab 6    benazepril (LOTENSIN) 20 mg tablet Take 20 mg by mouth daily.  pravastatin (PRAVACHOL) 20 mg tablet Take 20 mg by mouth nightly.  amLODIPine (NORVASC) 10 mg tablet Take  by mouth daily. Review of Systems  Constitutional: The patient has complains of pain in the neck due to severe arthritis. HEENT: The patient denies recent head trauma, eye pain, blurred vision,  hearing deficit, oropharyngeal mucosal pain or lesions, and the patient denies throat pain or discomfort. She is experiencing watery eyes is sneezing from the effects of the environmental/pollen allergy. Lymphatics: The patient denies palpable peripheral lymphadenopathy. Hematologic: The patient denies having bruising, bleeding, or progressive fatigue. Respiratory: Patient denies having shortness of breath, cough, sputum production, fever, or dyspnea on exertion. Cardiovascular: The patient denies having leg pain, leg swelling, heart palpitations, chest permit, chest pain, or lightheadedness. The patient denies having dyspnea on exertion. Gastrointestinal: The patient denies having nausea, emesis, or diarrhea. The patient denies having any hematemesis or blood in the stool. Genitourinary: Patient denies having urinary urgency, frequency, or dysuria. The patient denies having blood in the urine. Psychological: The patient denies having symptoms of nervousness, anxiety, depression, or thoughts of harming himself some of this.   Skin: Patient denies having skin rashes, skin, ulcerations, or unexplained itching or pruritus. Musculoskeletal: The patient complains of in her right knee, and her left hand. Objective:     Visit Vitals  /77   Pulse 74   Temp 98.8 °F (37.1 °C) (Oral)   Resp 16   Ht 5' 8\" (1.727 m)   Wt 89.8 kg (198 lb)   BMI 30.11 kg/m²     Pain 8/10 ECOG 0  Physical Exam:   Gen. Appearance: The patient is in no acute distress. Skin: There is no bruise or rash. HEENT: The exam is unremarkable. Neck: Supple without lymphadenopathy or thyromegaly. Lungs: Clear to auscultation and percussion; there are no wheezes or rhonchi. Heart: Regular rate and rhythm; there are no murmurs, gallops, or rubs. Abdomen: Bowel sounds are present and normal.  There is no guarding, tenderness, or hepatosplenomegaly. Extremities: There is no clubbing, cyanosis, or edema. Neurologic: There are no focal neurologic deficits. Lymphatics: There is no palpable peripheral lymphadenopathy. Musculoskeletal: Joint stiffness in her left hand. The patient has some deformity in her knee joints bilaterally. She is using a cane for mobility support. Psychological/psychiatric: There is no clinical evidence of anxiety, depression, or melancholy. Lab data:      Results for orders placed or performed during the hospital encounter of 04/10/19   CBC WITH 3 PART DIFF     Status: Abnormal   Result Value Ref Range Status    WBC 4.9 4.5 - 13.0 K/uL Final    RBC 3.66 (L) 4.10 - 5.10 M/uL Final    HGB 11.9 (L) 12.0 - 16 g/dL Final    HCT 35.4 (L) 36 - 48 % Final    MCV 96.7 78 - 102 FL Final    MCH 32.5 25.0 - 35.0 PG Final    MCHC 33.6 31 - 37 g/dL Final    RDW 14.7 (H) 11.5 - 14.5 % Final    PLATELET 229 443 - 922 K/uL Final    NEUTROPHILS 60 40 - 70 % Final    MIXED CELLS 9 0.1 - 17 % Final    LYMPHOCYTES 32 14 - 44 % Final    ABS. NEUTROPHILS 2.9 1.8 - 9.5 K/UL Final    ABS. MIXED CELLS 0.4 0.0 - 2.3 K/uL Final    ABS.  LYMPHOCYTES 1.6 1.1 - 5.9 K/UL Final     Comment: Test performed at [address] [place] [address] or [facility]patient [facility]fusion [facility] Location. Reviewed by Medical Director. DF AUTOMATED   Final           Assessment:     1. Myelodysplastic syndrome ([facility]    2. Chronic pain of multiple joints    3. Vitamin D deficiency    4. Refractory anemia due to myelodysplastic syndrome ([facility]    5. Pollen allergy      Plan:     Myelodysplastic syndrome/refractory anemia: I have explained to the patient that her CBC today shows that her hemoglobin is 11.9 g/dL with hematocrit of 35.4%. We will continue to monitor her H/H every 3 months and the patient will be offered Procrit if her Hgb and Hct declines below 10 g and 30%. Arthritis(progressive problem): The patient continues to complain of severe arthritic pain. Today the pain is primarily in her right knee with joint stiffness and pain in her left hand. This morning she rates her pain 8/10 after taking her pain medication. Her prescription for Percocet 10 mg was renewed today. Vitamin D Deficiency: The patient was informed that her Vitamin D on 1/9/2019 was normal at 24.4 ng/mL. She has previously completed a full course of vitamin D 50,000. I will recheck a vitamin D at this time. Pollen allergy (new problem): I have recommended Flonase, 2 puffs to each nostril once daily and Allegra-180 tablet p.o. daily as primary treatment modalities for her allergy symptoms. She was advised to follow up in 3 months.      Orders Placed This Encounter    COMPLETE CBC & AUTO DIFF WBC    InHouse CBC ([facility])     Standing Status:   Future     Number of Occurrences:   1     Standing Expiration Date:   7/16/4922    METABOLIC PANEL, COMPREHENSIVE     Standing Status:   Future     Number of Occurrences:   1     Standing Expiration Date:   4/10/2020    FERRITIN     Standing Status:   Future     Number of Occurrences:   1     Standing Expiration Date:   4/10/2020    VITAMIN D, 25 HYDROXY     Standing Status: Future     Number of Occurrences:   1     Standing Expiration Date:   4/10/2020    IRON PROFILE     Standing Status:   Future     Number of Occurrences:   1     Standing Expiration Date:   4/10/2020    oxyCODONE-acetaminophen (PERCOCET)  mg per tablet     Sig: Take 1 Tab by mouth every six (6) hours as needed for Pain for up to 30 days. Max Daily Amount: 4 Tabs. Dispense:  120 Tab     Refill:  0       Que Carranza MD, Talia Estimable  4/10/2019

## 2019-04-16 LAB
25(OH)D3+25(OH)D2 SERPL-MCNC: 39.8 NG/ML (ref 30–100)
ALBUMIN SERPL-MCNC: 4.3 G/DL (ref 3.5–4.8)
ALBUMIN/GLOB SERPL: 1.7 {RATIO} (ref 1.2–2.2)
ALP SERPL-CCNC: 78 IU/L (ref 39–117)
ALT SERPL-CCNC: 13 IU/L (ref 0–32)
AST SERPL-CCNC: 12 IU/L (ref 0–40)
BILIRUB SERPL-MCNC: <0.2 MG/DL (ref 0–1.2)
BUN SERPL-MCNC: 6 MG/DL (ref 8–27)
BUN/CREAT SERPL: 13 (ref 12–28)
CALCIUM SERPL-MCNC: 9.4 MG/DL (ref 8.7–10.3)
CHLORIDE SERPL-SCNC: 106 MMOL/L (ref 96–106)
CO2 SERPL-SCNC: 23 MMOL/L (ref 20–29)
CREAT SERPL-MCNC: 0.47 MG/DL (ref 0.57–1)
FERRITIN SERPL-MCNC: 126 NG/ML (ref 15–150)
GLOBULIN SER CALC-MCNC: 2.5 G/DL (ref 1.5–4.5)
GLUCOSE SERPL-MCNC: 95 MG/DL (ref 65–99)
IRON SATN MFR SERPL: 30 % (ref 15–55)
IRON SERPL-MCNC: 67 UG/DL (ref 27–139)
POTASSIUM SERPL-SCNC: 4.3 MMOL/L (ref 3.5–5.2)
PROT SERPL-MCNC: 6.8 G/DL (ref 6–8.5)
SODIUM SERPL-SCNC: 144 MMOL/L (ref 134–144)
TIBC SERPL-MCNC: 222 UG/DL (ref 250–450)
UIBC SERPL-MCNC: 155 UG/DL (ref 118–369)

## 2019-05-07 DIAGNOSIS — G89.29 CHRONIC PAIN OF MULTIPLE JOINTS: ICD-10-CM

## 2019-05-07 DIAGNOSIS — D46.9 MYELODYSPLASTIC SYNDROME (HCC): ICD-10-CM

## 2019-05-07 DIAGNOSIS — M25.50 CHRONIC PAIN OF MULTIPLE JOINTS: ICD-10-CM

## 2019-05-07 RX ORDER — OXYCODONE AND ACETAMINOPHEN 10; 325 MG/1; MG/1
1 TABLET ORAL
Qty: 120 TAB | Refills: 0 | Status: SHIPPED | OUTPATIENT
Start: 2019-05-07 | End: 2019-06-10 | Stop reason: SDUPTHER

## 2019-06-10 DIAGNOSIS — D46.9 MYELODYSPLASTIC SYNDROME (HCC): ICD-10-CM

## 2019-06-10 DIAGNOSIS — G89.29 CHRONIC PAIN OF MULTIPLE JOINTS: ICD-10-CM

## 2019-06-10 DIAGNOSIS — M25.50 CHRONIC PAIN OF MULTIPLE JOINTS: ICD-10-CM

## 2019-06-10 RX ORDER — OXYCODONE AND ACETAMINOPHEN 10; 325 MG/1; MG/1
1 TABLET ORAL
Qty: 120 TAB | Refills: 0 | Status: SHIPPED | OUTPATIENT
Start: 2019-06-10 | End: 2019-07-10 | Stop reason: SDUPTHER

## 2019-07-10 ENCOUNTER — HOSPITAL ENCOUNTER (OUTPATIENT)
Dept: ONCOLOGY | Age: 69
Discharge: HOME | End: 2019-07-10

## 2019-07-10 ENCOUNTER — OFFICE VISIT (OUTPATIENT)
Dept: ONCOLOGY | Age: 69
End: 2019-07-10

## 2019-07-10 VITALS
HEART RATE: 81 BPM | HEIGHT: 68 IN | RESPIRATION RATE: 17 BRPM | OXYGEN SATURATION: 99 % | WEIGHT: 193 LBS | SYSTOLIC BLOOD PRESSURE: 157 MMHG | TEMPERATURE: 97.7 F | BODY MASS INDEX: 29.25 KG/M2 | DIASTOLIC BLOOD PRESSURE: 76 MMHG

## 2019-07-10 DIAGNOSIS — M25.50 CHRONIC PAIN OF MULTIPLE JOINTS: ICD-10-CM

## 2019-07-10 DIAGNOSIS — D46.9 MYELODYSPLASTIC SYNDROME (HCC): ICD-10-CM

## 2019-07-10 DIAGNOSIS — G89.29 CHRONIC PAIN OF MULTIPLE JOINTS: ICD-10-CM

## 2019-07-10 DIAGNOSIS — D46.9 MYELODYSPLASTIC SYNDROME (HCC): Primary | ICD-10-CM

## 2019-07-10 DIAGNOSIS — E55.9 VITAMIN D DEFICIENCY: ICD-10-CM

## 2019-07-10 LAB
BASO+EOS+MONOS # BLD AUTO: 0.3 K/UL (ref 0–2.3)
BASO+EOS+MONOS NFR BLD AUTO: 6 % (ref 0.1–17)
DIFFERENTIAL METHOD BLD: ABNORMAL
ERYTHROCYTE [DISTWIDTH] IN BLOOD BY AUTOMATED COUNT: 14.9 % (ref 11.5–14.5)
HCT VFR BLD AUTO: 37.6 % (ref 36–48)
HGB BLD-MCNC: 12.4 G/DL (ref 12–16)
LYMPHOCYTES # BLD: 1 K/UL (ref 1.1–5.9)
LYMPHOCYTES NFR BLD: 21 % (ref 14–44)
MCH RBC QN AUTO: 31.8 PG (ref 25–35)
MCHC RBC AUTO-ENTMCNC: 33 G/DL (ref 31–37)
MCV RBC AUTO: 96.4 FL (ref 78–102)
NEUTS SEG # BLD: 3.5 K/UL (ref 1.8–9.5)
NEUTS SEG NFR BLD: 73 % (ref 40–70)
PLATELET # BLD AUTO: 313 K/UL (ref 140–440)
RBC # BLD AUTO: 3.9 M/UL (ref 4.1–5.1)
WBC # BLD AUTO: 4.8 K/UL (ref 4.5–13)

## 2019-07-10 RX ORDER — OXYCODONE AND ACETAMINOPHEN 10; 325 MG/1; MG/1
1 TABLET ORAL
Qty: 120 TAB | Refills: 0 | Status: SHIPPED | OUTPATIENT
Start: 2019-07-10 | End: 2019-08-06 | Stop reason: SDUPTHER

## 2019-07-10 NOTE — PROGRESS NOTES
Hematology/Oncology  Progress Note    Name: Martine Becker  Date: 7/10/2019  : 1950    PCP:Khadra Kruger MD      Ms. Pierre Sykes is a 77year old female who was seen for management of her myelodysplastic syndrome/refractory anemia. The patient also has quite severe arthritis. Current therapy: Percocet 10 mg 1 tablet every 6 hours as needed for pain control    Subjective:     Ms. Pierre Sykes is a 49-year-old [place] American woman, who is being seen in the office today for a follow up for her myelodysplastic syndrome with refractory anemia. She also has severe arthritis involving her neck, back, wrist and knees. She rates her pain 9/10 today primarily in her back, and her right knee. The patient continues to use Percocet as needed for her pain and is requesting a new prescription today. She is ambulating with the use of a cane at this time. She is currently doing phycial therapy. Her appetite and energy level are good. She denies fevers, chest pains, or shortness of breath. She has no additional complaints or concerns. She denies having any blood in her urine or stool. Past medical history, family history, and social history: these were reviewed and remains unchanged.     Past Medical History:   Diagnosis Date    Chronic pain     Chronic leg pain    Hypercholesterolemia     Hypertension     Myelodysplastic syndrome, unspecified ([address]     Refractory Anemia      Past Surgical History:   Procedure Laterality Date   Terrie Ege SURGERY  1943,9051    Work related back injury     Social History     Socioeconomic History    Marital status:      Spouse name: Not on file    Number of children: Not on file    Years of education: Not on file    Highest education level: Not on file   Occupational History    Not on file   Social Needs    Financial resource strain: Not on file    Food insecurity:     Worry: Not on file     Inability: Not on file    Transportation needs:     Medical: Not on file Non-medical: Not on file   Tobacco Use    Smoking status: Current Every Day Smoker     Packs/day: 1.50     Types: Cigarettes    Smokeless tobacco: Current User   Substance and Sexual Activity    Alcohol use: Yes     Comment: Occasional - beer    Drug use: Not on file    Sexual activity: Not on file   Lifestyle    Physical activity:     Days per week: Not on file     Minutes per session: Not on file    Stress: Not on file   Relationships    Social connections:     Talks on phone: Not on file     Gets together: Not on file     Attends [employer] service: Not on file     Active member of club or organization: Not on file     Attends meetings of clubs or organizations: Not on file     Relationship status: Not on file    Intimate partner violence:     Fear of current or ex partner: Not on file     Emotionally abused: Not on file     Physically abused: Not on file     Forced sexual activity: Not on file   Other Topics Concern    Not on file   Social History Narrative    Not on file     History reviewed. No pertinent family history. Current Outpatient Medications   Medication Sig Dispense Refill    oxyCODONE-acetaminophen (PERCOCET)  mg per tablet Take 1 Tab by mouth every six (6) hours as needed for Pain for up to 30 days. Max Daily Amount: 4 Tabs. 120 Tab 0    ergocalciferol (VITAMIN D2) 50,000 unit capsule Take 1 Cap by mouth every seven (7) days. 12 Cap 2    naloxone (NARCAN) 2 mg/actuation spry Use 1 spray intranasally into 1 nostril. Use a new Narcan nasal spray for subsequent doses and administer into alternating nostrils. May repeat every 2 to 3 minutes as needed. Indications: OPIATE-INDUCED RESPIRATORY DEPRESSION, OPIOID TOXICITY 1 Box 0    iron polysaccharides (FERREX 150) 150 mg iron capsule Take 1 Cap by mouth two (2) times a day. 90 Cap 6    ferrous sulfate 325 mg (65 mg iron) tablet Take 1 Tab by mouth Daily (before breakfast).  90 Tab 3    pantoprazole (PROTONIX) 40 mg tablet Take 1 Tab by mouth daily. 30 Tab 1    PROAIR HFA 90 mcg/actuation inhaler       gabapentin (NEURONTIN) 300 mg capsule       cyclobenzaprine (FLEXERIL) 10 mg tablet Take 1 Tab by mouth three (3) times daily as needed for Muscle Spasm(s). 90 Tab 6    benazepril (LOTENSIN) 20 mg tablet Take 20 mg by mouth daily.  pravastatin (PRAVACHOL) 20 mg tablet Take 20 mg by mouth nightly.  amLODIPine (NORVASC) 10 mg tablet Take  by mouth daily. Review of Systems  Constitutional: The patient has complains of pain in the neck due to severe arthritis. HEENT: The patient denies recent head trauma, eye pain, blurred vision,  hearing deficit, oropharyngeal mucosal pain or lesions, and the patient denies throat pain or discomfort. She is experiencing watery eyes is sneezing from the effects of the environmental/pollen allergy. Lymphatics: The patient denies palpable peripheral lymphadenopathy. Hematologic: The patient denies having bruising, bleeding, or progressive fatigue. Respiratory: Patient denies having shortness of breath, cough, sputum production, fever, or dyspnea on exertion. Cardiovascular: The patient denies having leg pain, leg swelling, heart palpitations, chest permit, chest pain, or lightheadedness. The patient denies having dyspnea on exertion. Gastrointestinal: The patient denies having nausea, emesis, or diarrhea. The patient denies having any hematemesis or blood in the stool. Genitourinary: Patient denies having urinary urgency, frequency, or dysuria. The patient denies having blood in the urine. Psychological: The patient denies having symptoms of nervousness, anxiety, depression, or thoughts of harming himself some of this. Skin: Patient denies having skin rashes, skin, ulcerations, or unexplained itching or pruritus. Musculoskeletal: The patient complains of in her right knee, and her left hand.       Objective:     Visit Vitals  /76   Pulse 81   Temp 97.7 °F (36.5 °C) (Oral) Resp 17   Ht 5' 8\" (1.727 m)   Wt 87.5 kg (193 lb)   SpO2 99%   BMI 29.35 kg/m²     Pain 8/10 ECOG 0  Physical Exam:   Gen. Appearance: The patient is in no acute distress. Skin: There is no bruise or rash. HEENT: The exam is unremarkable. Neck: Supple without lymphadenopathy or thyromegaly. Lungs: Clear to auscultation and percussion; there are no wheezes or rhonchi. Heart: Regular rate and rhythm; there are no murmurs, gallops, or rubs. Abdomen: Bowel sounds are present and normal.  There is no guarding, tenderness, or hepatosplenomegaly. Extremities: There is no clubbing, cyanosis, or edema. Neurologic: There are no focal neurologic deficits. Lymphatics: There is no palpable peripheral lymphadenopathy. Musculoskeletal: Joint stiffness in her left hand. The patient has some deformity in her knee joints bilaterally. She is using a cane for mobility support. Psychological/psychiatric: There is no clinical evidence of anxiety, depression, or melancholy. Lab data:      Results for orders placed or performed during the hospital encounter of 07/10/19   CBC WITH 3 PART DIFF     Status: Abnormal   Result Value Ref Range Status    WBC 4.8 4.5 - 13.0 K/uL Final    RBC 3.90 (L) 4.10 - 5.10 M/uL Final    HGB 12.4 12.0 - 16 g/dL Final    HCT 37.6 36 - 48 % Final    MCV 96.4 78 - 102 FL Final    MCH 31.8 25.0 - 35.0 PG Final    MCHC 33.0 31 - 37 g/dL Final    RDW 14.9 (H) 11.5 - 14.5 % Final    PLATELET 842 054 - 421 K/uL Final    NEUTROPHILS 73 (H) 40 - 70 % Final    MIXED CELLS 6 0.1 - 17 % Final    LYMPHOCYTES 21 14 - 44 % Final    ABS. NEUTROPHILS 3.5 1.8 - 9.5 K/UL Final    ABS. MIXED CELLS 0.3 0.0 - 2.3 K/uL Final    ABS. LYMPHOCYTES 1.0 (L) 1.1 - 5.9 K/UL Final     Comment: Test performed at [address] [place] [address] or [facility] Location. Reviewed by Medical Director. DF AUTOMATED   Final           Assessment:     1. Myelodysplastic syndrome ([address]    2. Vitamin D deficiency    3. Chronic pain of multiple joints      Plan:     Myelodysplastic syndrome/refractory anemia: I have explained to the patient that her CBC today shows that her hemoglobin is 12.4g/dL with hematocrit of 37.6%. We will continue to monitor her H/H every 3 months and the patient will be offered Procrit if her Hgb and Hct declines below 10 g and 30%. Arthritis(progressive problem): The patient continues to complain of severe arthritic pain. Today the pain is primarily in her right knee with joint stiffness and pain in her back. This morning she rates her pain 9/10 after taking her pain medication. Her prescription for Percocet 10 mg was renewed today. Vitamin D Deficiency: The patient was informed that her Vitamin D on 4/10/2019 was normal at 39.8 ng/mL. She has previously completed a full course of vitamin D 50,000. I will recheck a vitamin D during her next office visit. She was advised to follow up in 3 months. Orders Placed This Encounter    COMPLETE CBC & AUTO DIFF WBC    InHouse CBC ([employer])     Standing Status:   Future     Number of Occurrences:   1     Standing Expiration Date:   7/17/2019    FERRITIN     Standing Status:   Future     Number of Occurrences:   1     Standing Expiration Date:   7/10/2020    IRON PROFILE     Standing Status:   Future     Number of Occurrences:   1     Standing Expiration Date:   7/64/0467    METABOLIC PANEL, COMPREHENSIVE     Standing Status:   Future     Number of Occurrences:   1     Standing Expiration Date:   7/10/2020       ASHLEY Kwan  7/10/2019     I have assessed the patient independently and  agree with the full assessment as outlined.   Kasie Gonzalez MD, [place]

## 2019-07-10 NOTE — PATIENT INSTRUCTIONS
Chronic Pain: Care Instructions  Your Care Instructions    Chronic pain is pain that lasts a long time (months or even years) and may or may not have a clear cause. It is different from acute pain, which usually does have a clear cause--like an injury or illness--and gets better over time. Chronic pain:  · Lasts over time but may vary from day to day. · Does not go away despite efforts to end it. · May disrupt your sleep and lead to fatigue. · May cause depression or anxiety. · May make your muscles tense, causing more pain. · Can disrupt your work, hobbies, home life, and relationships with friends and family. Chronic pain is a very real condition. It is not just in your head. Treatment can help and usually includes several methods used together, such as medicines, physical therapy, exercise, and other treatments. Learning how to relax and changing negative thought patterns can also help you cope. Chronic pain is complex. Taking an active role in your treatment will help you better manage your pain. Tell your doctor if you have trouble dealing with your pain. You may have to try several things before you find what works best for you. Follow-up care is a key part of your treatment and safety. Be sure to make and go to all appointments, and call your doctor if you are having problems. It's also a good idea to know your test results and keep a list of the medicines you take. How can you care for yourself at home? · Pace yourself. Break up large jobs into smaller tasks. Save harder tasks for days when you have less pain, or go back and forth between hard tasks and easier ones. Take rest breaks. · Relax, and reduce stress. Relaxation techniques such as deep breathing or meditation can help. · Keep moving. Gentle, daily exercise can help reduce pain over the long run. Try low- or no-impact exercises such as walking, swimming, and stationary biking. Do stretches to stay flexible.   · Try heat, cold packs, and massage. · Get enough sleep. Chronic pain can make you tired and drain your energy. Talk with your doctor if you have trouble sleeping because of pain. · Think positive. Your thoughts can affect your pain level. Do things that you enjoy to distract yourself when you have pain instead of focusing on the pain. See a movie, read a book, listen to music, or spend time with a friend. · If you think you are depressed, talk to your doctor about treatment. · Keep a daily pain diary. Record how your moods, thoughts, sleep patterns, activities, and medicine affect your pain. You may find that your pain is worse during or after certain activities or when you are feeling a certain emotion. Having a record of your pain can help you and your doctor find the best ways to treat your pain. · Take pain medicines exactly as directed. ? If the doctor gave you a prescription medicine for pain, take it as prescribed. ? If you are not taking a prescription pain medicine, ask your doctor if you can take an over-the-counter medicine. Reducing constipation caused by pain medicine  · Include fruits, vegetables, beans, and whole grains in your diet each day. These foods are high in fiber. · Drink plenty of fluids, enough so that your urine is light yellow or clear like water. If you have kidney, heart, or liver disease and have to limit fluids, talk with your doctor before you increase the amount of fluids you drink. · If your doctor recommends it, get more exercise. Walking is a good choice. Bit by bit, increase the amount you walk every day. Try for at least 30 minutes on most days of the week. · Schedule time each day for a bowel movement. A daily routine may help. Take your time and do not strain when having a bowel movement. When should you call for help?   Call your doctor now or seek immediate medical care if:    · Your pain gets worse or is out of control.     · You feel down or blue, or you do not enjoy things like you once did. You may be depressed, which is common in people with chronic pain. Depression can be treated.     · You have vomiting or cramps for more than 2 hours.    Watch closely for changes in your health, and be sure to contact your doctor if:    · You cannot sleep because of pain.     · You are very worried or anxious about your pain.     · You have trouble taking your pain medicine.     · You have any concerns about your pain medicine.     · You have trouble with bowel movements, such as:  ? No bowel movement in 3 days. ? Blood in the anal area, in your stool, or on the toilet paper. ? Diarrhea for more than 24 hours. Where can you learn more? Go to http://marjorie-marianela.info/. Enter N004 in the search box to learn more about \"Chronic Pain: Care Instructions. \"  Current as of: Leeanne 3, 2018  Content Version: 11.9  © 3874-8407 [facility] [facility]. Care instructions adapted under license by [employer] (which disclaims liability or warranty for this information). If you have questions about a medical condition or this instruction, always ask your healthcare professional. [place] [address] any warranty or liability for your use of this information. Myelodysplastic Syndromes: Care Instructions  Your Care Instructions  Myelodysplastic syndromes, also called MDS, are a group of rare conditions in which the bone marrow does not make enough healthy blood cells. Normally, the bone marrow makes red blood cells, white blood cells, and platelets. These cells carry oxygen in the blood, help the body fight infections, and help the blood clot. With MDS, you may feel weak and tired, get infections often, and bruise easily, although symptoms tend to vary. MDS is a form of blood cancer. In some cases, MDS can turn into acute myeloid leukemia (AML), another type of cancer. Some people develop MDS after treatment for cancer or exposure to pesticides or other chemicals. But in most cases, the cause of MDS is not known. Your doctor will use the results of blood tests to guide your treatment. There are many types of MDS, with different treatment plans for each. If you have enough red blood cells and are feeling all right, you may not need active treatment, but you and your doctor will want to watch your condition carefully. If you start feeling lightheaded and have no energy, you may need a blood transfusion. Your doctor also may give you antibiotics to prevent or treat infection. Follow-up care is a key part of your treatment and safety. Be sure to make and go to all appointments, and call your doctor if you are having problems. It's also a good idea to know your test results and keep a list of the medicines you take. How can you care for yourself at home? · Take your medicines exactly as prescribed. Call your doctor if you think you are having a problem with your medicine. You will get more details on the specific medicines your doctor prescribes. · If your doctor prescribed antibiotics, take them as directed. Do not stop taking them just because you feel better. You need to take the full course of antibiotics. If you have side effects from antibiotics, tell your doctor. · Take steps to control your stress and workload. Learn relaxation techniques. ? Share your feelings. Stress and tension affect our emotions. By expressing your feelings to others, you may be able to understand and cope with them. ? Consider joining a support group. Talking about a problem with your spouse, a good friend, or other people with similar problems is a good way to reduce tension and stress. ? Express yourself with art. Try writing, crafts, dance, or art to relieve stress. ? Be kind to your body and mind. Getting enough sleep, eating a healthy diet, and taking time to do things you enjoy can contribute to an overall feeling of balance in your life and help reduce stress.   ? Get help if you need it. Discuss your concerns with your doctor or counselor. · Do not smoke. Smoking can make blood problems worse. If you need help quitting, talk to your doctor about stop-smoking programs and medicines. These can increase your chances of quitting for good. · If you have not already done so, prepare a list of advance directives. Advance directives are instructions to your doctor and family members about what kind of care you want if you become unable to speak or express yourself. · Call the [employer] (1-329.406.7732) or visit its website at [address] for more information. When should you call for help? Call 911 anytime you think you may need emergency care. For example, call if:    · You passed out (lost consciousness).    Call your doctor now or seek immediate medical care if:    · You have a fever.     · You have abnormal bleeding.     · You have new or worse pain.     · You think you have an infection.     · You have new symptoms, such as a cough, belly pain, vomiting, diarrhea, or a rash.    Watch closely for changes in your health, and be sure to contact your doctor if:    · You are much more tired than usual.     · You have swollen glands in your armpits, groin, or neck.     · You do not get better as expected. Where can you learn more? Go to http://marjorie-marianela.info/. Enter Fela Adames in the search box to learn more about \"Myelodysplastic Syndromes: Care Instructions. \"  Current as of: March 27, 2018  Content Version: 11.9  © 2324-9982 [employer]. Care instructions adapted under license by [employer] (which disclaims liability or warranty for this information). If you have questions about a medical condition or this instruction, always ask your healthcare professional. [address] any warranty or liability for your use of this information.

## 2019-08-06 DIAGNOSIS — M25.50 CHRONIC PAIN OF MULTIPLE JOINTS: ICD-10-CM

## 2019-08-06 DIAGNOSIS — G89.29 CHRONIC PAIN OF MULTIPLE JOINTS: ICD-10-CM

## 2019-08-06 DIAGNOSIS — D46.9 MYELODYSPLASTIC SYNDROME (HCC): ICD-10-CM

## 2019-08-06 RX ORDER — OXYCODONE AND ACETAMINOPHEN 10; 325 MG/1; MG/1
1 TABLET ORAL
Qty: 120 TAB | Refills: 0 | Status: SHIPPED | OUTPATIENT
Start: 2019-08-06 | End: 2019-09-05

## 2019-09-12 ENCOUNTER — DOCUMENTATION ONLY (OUTPATIENT)
Dept: ONCOLOGY | Age: 69
End: 2019-09-12

## 2019-09-12 DIAGNOSIS — D46.9 MDS (MYELODYSPLASTIC SYNDROME) (HCC): ICD-10-CM

## 2019-09-12 DIAGNOSIS — D46.9 MDS (MYELODYSPLASTIC SYNDROME) (HCC): Primary | ICD-10-CM

## 2019-09-12 RX ORDER — OXYCODONE AND ACETAMINOPHEN 10; 325 MG/1; MG/1
1 TABLET ORAL
Qty: 120 TAB | Refills: 0 | Status: SHIPPED | OUTPATIENT
Start: 2019-09-12 | End: 2019-10-16 | Stop reason: SDUPTHER

## 2019-09-12 RX ORDER — OXYCODONE AND ACETAMINOPHEN 10; 325 MG/1; MG/1
1 TABLET ORAL
Qty: 120 TAB | Refills: 0 | Status: SHIPPED | OUTPATIENT
Start: 2019-09-12 | End: 2019-09-12 | Stop reason: SDUPTHER

## 2019-09-12 NOTE — TELEPHONE ENCOUNTER
Patient requesting a new Rx for chronic right knee and lower back pain, unchanged.  and chart reviewed. Percocet  mg every 6 hours as needed for pain given today.

## 2019-10-16 ENCOUNTER — OFFICE VISIT (OUTPATIENT)
Dept: ONCOLOGY | Age: 69
End: 2019-10-16

## 2019-10-16 VITALS
DIASTOLIC BLOOD PRESSURE: 67 MMHG | BODY MASS INDEX: 28.95 KG/M2 | RESPIRATION RATE: 17 BRPM | SYSTOLIC BLOOD PRESSURE: 154 MMHG | WEIGHT: 191 LBS | HEIGHT: 68 IN | OXYGEN SATURATION: 98 % | HEART RATE: 79 BPM

## 2019-10-16 DIAGNOSIS — M25.50 CHRONIC PAIN OF MULTIPLE JOINTS: ICD-10-CM

## 2019-10-16 DIAGNOSIS — D46.9 MDS (MYELODYSPLASTIC SYNDROME) (HCC): ICD-10-CM

## 2019-10-16 DIAGNOSIS — D46.9 MDS (MYELODYSPLASTIC SYNDROME) (HCC): Primary | ICD-10-CM

## 2019-10-16 DIAGNOSIS — D46.4 REFRACTORY ANEMIA DUE TO MYELODYSPLASTIC SYNDROME (HCC): ICD-10-CM

## 2019-10-16 DIAGNOSIS — E55.9 VITAMIN D DEFICIENCY: ICD-10-CM

## 2019-10-16 DIAGNOSIS — G89.29 CHRONIC PAIN OF MULTIPLE JOINTS: ICD-10-CM

## 2019-10-16 RX ORDER — OXYCODONE AND ACETAMINOPHEN 10; 325 MG/1; MG/1
1 TABLET ORAL
Qty: 120 TAB | Refills: 0 | Status: SHIPPED | OUTPATIENT
Start: 2019-10-16 | End: 2019-11-13 | Stop reason: SDUPTHER

## 2019-10-16 NOTE — PATIENT INSTRUCTIONS
Chronic Pain: Care Instructions  Your Care Instructions    Chronic pain is pain that lasts a long time (months or even years) and may or may not have a clear cause. It is different from acute pain, which usually does have a clear cause--like an injury or illness--and gets better over time. Chronic pain:  · Lasts over time but may vary from day to day. · Does not go away despite efforts to end it. · May disrupt your sleep and lead to fatigue. · May cause depression or anxiety. · May make your muscles tense, causing more pain. · Can disrupt your work, hobbies, home life, and relationships with friends and family. Chronic pain is a very real condition. It is not just in your head. Treatment can help and usually includes several methods used together, such as medicines, physical therapy, exercise, and other treatments. Learning how to relax and changing negative thought patterns can also help you cope. Chronic pain is complex. Taking an active role in your treatment will help you better manage your pain. Tell your doctor if you have trouble dealing with your pain. You may have to try several things before you find what works best for you. Follow-up care is a key part of your treatment and safety. Be sure to make and go to all appointments, and call your doctor if you are having problems. It's also a good idea to know your test results and keep a list of the medicines you take. How can you care for yourself at home? · Pace yourself. Break up large jobs into smaller tasks. Save harder tasks for days when you have less pain, or go back and forth between hard tasks and easier ones. Take rest breaks. · Relax, and reduce stress. Relaxation techniques such as deep breathing or meditation can help. · Keep moving. Gentle, daily exercise can help reduce pain over the long run. Try low- or no-impact exercises such as walking, swimming, and stationary biking. Do stretches to stay flexible.   · Try heat, cold packs, and massage. · Get enough sleep. Chronic pain can make you tired and drain your energy. Talk with your doctor if you have trouble sleeping because of pain. · Think positive. Your thoughts can affect your pain level. Do things that you enjoy to distract yourself when you have pain instead of focusing on the pain. See a movie, read a book, listen to music, or spend time with a friend. · If you think you are depressed, talk to your doctor about treatment. · Keep a daily pain diary. Record how your moods, thoughts, sleep patterns, activities, and medicine affect your pain. You may find that your pain is worse during or after certain activities or when you are feeling a certain emotion. Having a record of your pain can help you and your doctor find the best ways to treat your pain. · Take pain medicines exactly as directed. ? If the doctor gave you a prescription medicine for pain, take it as prescribed. ? If you are not taking a prescription pain medicine, ask your doctor if you can take an over-the-counter medicine. Reducing constipation caused by pain medicine  · Include fruits, vegetables, beans, and whole grains in your diet each day. These foods are high in fiber. · Drink plenty of fluids, enough so that your urine is light yellow or clear like water. If you have kidney, heart, or liver disease and have to limit fluids, talk with your doctor before you increase the amount of fluids you drink. · If your doctor recommends it, get more exercise. Walking is a good choice. Bit by bit, increase the amount you walk every day. Try for at least 30 minutes on most days of the week. · Schedule time each day for a bowel movement. A daily routine may help. Take your time and do not strain when having a bowel movement. When should you call for help?   Call your doctor now or seek immediate medical care if:    · Your pain gets worse or is out of control.     · You feel down or blue, or you do not enjoy things like you once did. You may be depressed, which is common in people with chronic pain. Depression can be treated.     · You have vomiting or cramps for more than 2 hours.    Watch closely for changes in your health, and be sure to contact your doctor if:    · You cannot sleep because of pain.     · You are very worried or anxious about your pain.     · You have trouble taking your pain medicine.     · You have any concerns about your pain medicine.     · You have trouble with bowel movements, such as:  ? No bowel movement in 3 days. ? Blood in the anal area, in your stool, or on the toilet paper. ? Diarrhea for more than 24 hours. Where can you learn more? Go to http://marjorie-marianela.info/. Enter N004 in the search box to learn more about \"Chronic Pain: Care Instructions. \"  Current as of: March 28, 2019  Content Version: 12.2  © 4504-6065 [employer]. Care instructions adapted under license by [employer] (which disclaims liability or warranty for this information). If you have questions about a medical condition or this instruction, always ask your healthcare professional. [place] [address] any warranty or liability for your use of this information. Myelodysplastic Syndromes: Care Instructions  Your Care Instructions  Myelodysplastic syndromes, also called MDS, are a group of rare conditions in which the bone marrow does not make enough healthy blood cells. Normally, the bone marrow makes red blood cells, white blood cells, and platelets. These cells carry oxygen in the blood, help the body fight infections, and help the blood clot. With MDS, you may feel weak and tired, get infections often, and bruise easily, although symptoms tend to vary. MDS is a form of blood cancer. In some cases, MDS can turn into acute myeloid leukemia (AML), another type of cancer. Some people develop MDS after treatment for cancer or exposure to pesticides or other chemicals. But in most cases, the cause of MDS is not known. Your doctor will use the results of blood tests to guide your treatment. There are many types of MDS, with different treatment plans for each. If you have enough red blood cells and are feeling all right, you may not need active treatment, but you and your doctor will want to watch your condition carefully. If you start feeling lightheaded and have no energy, you may need a blood transfusion. Your doctor also may give you antibiotics to prevent or treat infection. Follow-up care is a key part of your treatment and safety. Be sure to make and go to all appointments, and call your doctor if you are having problems. It's also a good idea to know your test results and keep a list of the medicines you take. How can you care for yourself at home? · Take your medicines exactly as prescribed. Call your doctor if you think you are having a problem with your medicine. You will get more details on the specific medicines your doctor prescribes. · If your doctor prescribed antibiotics, take them as directed. Do not stop taking them just because you feel better. You need to take the full course of antibiotics. If you have side effects from antibiotics, tell your doctor. · Take steps to control your stress and workload. Learn relaxation techniques. ? Share your feelings. Stress and tension affect our emotions. By expressing your feelings to others, you may be able to understand and cope with them. ? Consider joining a support group. Talking about a problem with your spouse, a good friend, or other people with similar problems is a good way to reduce tension and stress. ? Express yourself with art. Try writing, crafts, dance, or art to relieve stress. ? Be kind to your body and mind. Getting enough sleep, eating a healthy diet, and taking time to do things you enjoy can contribute to an overall feeling of balance in your life and help reduce stress.   ? Get help if you need it. Discuss your concerns with your doctor or counselor. · Do not smoke. Smoking can make blood problems worse. If you need help quitting, talk to your doctor about stop-smoking programs and medicines. These can increase your chances of quitting for good. · If you have not already done so, prepare a list of advance directives. Advance directives are instructions to your doctor and family members about what kind of care you want if you become unable to speak or express yourself. · Call the [employer] (6-271.956.5957) or visit its website at [address] for more information. When should you call for help? Call 911 anytime you think you may need emergency care. For example, call if:    · You passed out (lost consciousness).    Call your doctor now or seek immediate medical care if:    · You have a fever.     · You have abnormal bleeding.     · You have new or worse pain.     · You think you have an infection.     · You have new symptoms, such as a cough, belly pain, vomiting, diarrhea, or a rash.    Watch closely for changes in your health, and be sure to contact your doctor if:    · You are much more tired than usual.     · You have swollen glands in your armpits, groin, or neck.     · You do not get better as expected. Where can you learn more? Go to http://marjorie-marianela.info/. Enter Barb Frames in the search box to learn more about \"Myelodysplastic Syndromes: Care Instructions. \"  Current as of: December 19, 2018  Content Version: 12.2  © 8660-8343 [employer]. Care instructions adapted under license by [employer] (which disclaims liability or warranty for this information). If you have questions about a medical condition or this instruction, always ask your healthcare professional. [address] any warranty or liability for your use of this information.

## 2019-10-16 NOTE — PROGRESS NOTES
Hematology/Oncology  Progress Note    Name: Allie Alarcon  Date: 10/16/2019  : 1950    PCP:Khadra Cuadra MD      Ms. Morgan Handy is a 77year old female who was seen for management of her myelodysplastic syndrome/refractory anemia. The patient also has quite severe arthritis. Current therapy: Percocet 10 mg 1 tablet every 6 hours as needed for pain control    Subjective:     Ms. Morgan Handy is a 66-year-old [facility] American woman, who is being seen in the office today for a follow up for her myelodysplastic syndrome with refractory anemia. She also has severe arthritis involving her neck, back, wrist and knees. She rates her pain 8/10 today primarily in her back, and her right knee. The patient continues to use Percocet as needed for her pain and is requesting a new prescription today. She is ambulating with the use of a cane at this time. She report that she recently stop phycial therapy because she was in too much pain. Her appetite and energy level are good. She denies fevers, chest pains, or shortness of breath. She has no additional complaints or concerns. She denies having any blood in her urine or stool. Past medical history, family history, and social history: these were reviewed and remains unchanged.     Past Medical History:   Diagnosis Date    Chronic pain     Chronic leg pain    Hypercholesterolemia     Hypertension     Myelodysplastic syndrome, unspecified ([address]     Refractory Anemia      Past Surgical History:   Procedure Laterality Date   [facility]  [id number]    Work related back injury     Social History     Socioeconomic History    Marital status:      Spouse name: Not on file    Number of children: Not on file    Years of education: Not on file    Highest education level: Not on file   Occupational History    Not on file   Social Needs    Financial resource strain: Not on file    Food insecurity:     Worry: Not on file     Inability: Not on file   [facility] Transportation needs:     Medical: Not on file     Non-medical: Not on file   Tobacco Use    Smoking status: Current Every Day Smoker     Packs/day: 1.50     Types: Cigarettes    Smokeless tobacco: Current User   Substance and Sexual Activity    Alcohol use: Yes     Comment: Occasional - beer    Drug use: Not on file    Sexual activity: Not on file   Lifestyle    Physical activity:     Days per week: Not on file     Minutes per session: Not on file    Stress: Not on file   Relationships    Social connections:     Talks on phone: Not on file     Gets together: Not on file     Attends Scientologist service: Not on file     Active member of club or organization: Not on file     Attends meetings of clubs or organizations: Not on file     Relationship status: Not on file    Intimate partner violence:     Fear of current or ex partner: Not on file     Emotionally abused: Not on file     Physically abused: Not on file     Forced sexual activity: Not on file   Other Topics Concern    Not on file   Social History Narrative    Not on file     No family history on file. Current Outpatient Medications   Medication Sig Dispense Refill    oxyCODONE-acetaminophen (PERCOCET 10)  mg per tablet Take 1 Tab by mouth every six (6) hours as needed for Pain for up to 30 days. Max Daily Amount: 4 Tabs. Indications: chronic pain 120 Tab 0    ergocalciferol (VITAMIN D2) 50,000 unit capsule Take 1 Cap by mouth every seven (7) days. 12 Cap 2    naloxone (NARCAN) 2 mg/actuation spry Use 1 spray intranasally into 1 nostril. Use a new Narcan nasal spray for subsequent doses and administer into alternating nostrils. May repeat every 2 to 3 minutes as needed. Indications: OPIATE-INDUCED RESPIRATORY DEPRESSION, OPIOID TOXICITY 1 Box 0    iron polysaccharides (FERREX 150) 150 mg iron capsule Take 1 Cap by mouth two (2) times a day. 90 Cap 6    ferrous sulfate 325 mg (65 mg iron) tablet Take 1 Tab by mouth Daily (before breakfast). 90 Tab 3    pantoprazole (PROTONIX) 40 mg tablet Take 1 Tab by mouth daily. 30 Tab 1    PROAIR HFA 90 mcg/actuation inhaler       gabapentin (NEURONTIN) 300 mg capsule       cyclobenzaprine (FLEXERIL) 10 mg tablet Take 1 Tab by mouth three (3) times daily as needed for Muscle Spasm(s). 90 Tab 6    benazepril (LOTENSIN) 20 mg tablet Take 20 mg by mouth daily.  pravastatin (PRAVACHOL) 20 mg tablet Take 20 mg by mouth nightly.  amLODIPine (NORVASC) 10 mg tablet Take  by mouth daily. Review of Systems  Constitutional: The patient has complains of pain in the neck due to severe arthritis. HEENT: The patient denies recent head trauma, eye pain, blurred vision,  hearing deficit, oropharyngeal mucosal pain or lesions, and the patient denies throat pain or discomfort. She is experiencing watery eyes is sneezing from the effects of the environmental/pollen allergy. Lymphatics: The patient denies palpable peripheral lymphadenopathy. Hematologic: The patient denies having bruising, bleeding, or progressive fatigue. Respiratory: Patient denies having shortness of breath, cough, sputum production, fever, or dyspnea on exertion. Cardiovascular: The patient denies having leg pain, leg swelling, heart palpitations, chest permit, chest pain, or lightheadedness. The patient denies having dyspnea on exertion. Gastrointestinal: The patient denies having nausea, emesis, or diarrhea. The patient denies having any hematemesis or blood in the stool. Genitourinary: Patient denies having urinary urgency, frequency, or dysuria. The patient denies having blood in the urine. Psychological: The patient denies having symptoms of nervousness, anxiety, depression, or thoughts of harming himself some of this. Skin: Patient denies having skin rashes, skin, ulcerations, or unexplained itching or pruritus. Musculoskeletal: The patient complains of in her right knee, and her left hand.       Objective:     Visit Vitals  /67   Pulse 79   Resp 17   Ht 5' 8\" (1.727 m)   Wt 86.6 kg (191 lb)   SpO2 98%   BMI 29.04 kg/m²     Pain 8/10 ECOG 0  Physical Exam:   Gen. Appearance: The patient is in no acute distress. Skin: There is no bruise or rash. HEENT: The exam is unremarkable. Neck: Supple without lymphadenopathy or thyromegaly. Lungs: Clear to auscultation and percussion; there are no wheezes or rhonchi. Heart: Regular rate and rhythm; there are no murmurs, gallops, or rubs. Abdomen: Bowel sounds are present and normal.  There is no guarding, tenderness, or hepatosplenomegaly. Extremities: There is no clubbing, cyanosis, or edema. Neurologic: There are no focal neurologic deficits. Lymphatics: There is no palpable peripheral lymphadenopathy. Musculoskeletal: Joint stiffness in her left hand. The patient has some deformity in her knee joints bilaterally. She is using a cane for mobility support. Psychological/psychiatric: There is no clinical evidence of anxiety, depression, or melancholy. Lab data:      Results for orders placed or performed during the hospital encounter of 07/10/19   CBC WITH 3 PART DIFF     Status: Abnormal   Result Value Ref Range Status    WBC 4.8 4.5 - 13.0 K/uL Final    RBC 3.90 (L) 4.10 - 5.10 M/uL Final    HGB 12.4 12.0 - 16 g/dL Final    HCT 37.6 36 - 48 % Final    MCV 96.4 78 - 102 FL Final    MCH 31.8 25.0 - 35.0 PG Final    MCHC 33.0 31 - 37 g/dL Final    RDW 14.9 (H) 11.5 - 14.5 % Final    PLATELET 102 390 - 087 K/uL Final    NEUTROPHILS 73 (H) 40 - 70 % Final    MIXED CELLS 6 0.1 - 17 % Final    LYMPHOCYTES 21 14 - 44 % Final    ABS. NEUTROPHILS 3.5 1.8 - 9.5 K/UL Final    ABS. MIXED CELLS 0.3 0.0 - 2.3 K/uL Final    ABS. LYMPHOCYTES 1.0 (L) 1.1 - 5.9 K/UL Final     Comment: Test performed at [address] [place] [address] or Outpatient Infusion Center Location. Reviewed by Medical Director. DF AUTOMATED   Final           Assessment:     1.  MDS (myelodysplastic syndrome) ([facility] [address]    2. Chronic pain of multiple joints    3. Vitamin D deficiency    4. Refractory anemia due to myelodysplastic syndrome [facility])      Plan:     Myelodysplastic syndrome/refractory anemia: I have explained to the patient that her CBC today is pending. We will continue to monitor her H/H every 3 months and the patient will be offered Procrit if her Hgb and Hct declines below 10 g and 30%. Arthritis(progressive problem): The patient continues to complain of severe arthritic pain. Today the pain is primarily in her right knee with joint stiffness and pain in her back. This morning she rates her pain 8/10 after taking her pain medication. Her prescription for Percocet 10 mg was renewed today. She also report getting injections as well. Vitamin D Deficiency: The patient was informed that her Vitamin D on 4/10/2019 was normal at 39.8 ng/mL. She has previously completed a full course of vitamin D 50,000. I will recheck a vitamin D during her next office visit. She was advised to follow up in 3 months. Orders Placed This Encounter    CBC WITH AUTOMATED DIFF     Standing Status:   Future     Number of Occurrences:   1     Standing Expiration Date:   29/63/7243    METABOLIC PANEL, COMPREHENSIVE     Standing Status:   Future     Number of Occurrences:   1     Standing Expiration Date:   10/16/2020    FERRITIN     Standing Status:   Future     Number of Occurrences:   1     Standing Expiration Date:   10/16/2020    IRON PROFILE     Standing Status:   Future     Number of Occurrences:   1     Standing Expiration Date:   10/16/2020       ASHLEY Martinez  10/16/2019     I have assessed the patient independently and  agree with the full assessment as outlined.   Urvashi Loo MD, Asaf Alvarez

## 2019-10-17 LAB
ALBUMIN SERPL-MCNC: 4.3 G/DL (ref 3.5–4.8)
ALBUMIN/GLOB SERPL: 1.8 {RATIO} (ref 1.2–2.2)
ALP SERPL-CCNC: 71 IU/L (ref 39–117)
ALT SERPL-CCNC: 11 IU/L (ref 0–32)
AST SERPL-CCNC: 14 IU/L (ref 0–40)
BASOPHILS # BLD AUTO: 0 X10E3/UL (ref 0–0.2)
BASOPHILS NFR BLD AUTO: 0 %
BILIRUB SERPL-MCNC: <0.2 MG/DL (ref 0–1.2)
BUN SERPL-MCNC: 6 MG/DL (ref 8–27)
BUN/CREAT SERPL: 12 (ref 12–28)
CALCIUM SERPL-MCNC: 9.7 MG/DL (ref 8.7–10.3)
CHLORIDE SERPL-SCNC: 107 MMOL/L (ref 96–106)
CO2 SERPL-SCNC: 21 MMOL/L (ref 20–29)
CREAT SERPL-MCNC: 0.5 MG/DL (ref 0.57–1)
EOSINOPHIL # BLD AUTO: 0.1 X10E3/UL (ref 0–0.4)
EOSINOPHIL NFR BLD AUTO: 1 %
ERYTHROCYTE [DISTWIDTH] IN BLOOD BY AUTOMATED COUNT: 14.3 % (ref 12.3–15.4)
FERRITIN SERPL-MCNC: 151 NG/ML (ref 15–150)
GLOBULIN SER CALC-MCNC: 2.4 G/DL (ref 1.5–4.5)
GLUCOSE SERPL-MCNC: 88 MG/DL (ref 65–99)
HCT VFR BLD AUTO: 35.6 % (ref 34–46.6)
HGB BLD-MCNC: 11.9 G/DL (ref 11.1–15.9)
IMM GRANULOCYTES # BLD AUTO: 0 X10E3/UL (ref 0–0.1)
IMM GRANULOCYTES NFR BLD AUTO: 0 %
IRON SATN MFR SERPL: 28 % (ref 15–55)
IRON SERPL-MCNC: 67 UG/DL (ref 27–139)
LYMPHOCYTES # BLD AUTO: 1.3 X10E3/UL (ref 0.7–3.1)
LYMPHOCYTES NFR BLD AUTO: 30 %
MCH RBC QN AUTO: 31 PG (ref 26.6–33)
MCHC RBC AUTO-ENTMCNC: 33.4 G/DL (ref 31.5–35.7)
MCV RBC AUTO: 93 FL (ref 79–97)
MONOCYTES # BLD AUTO: 0.3 X10E3/UL (ref 0.1–0.9)
MONOCYTES NFR BLD AUTO: 7 %
NEUTROPHILS # BLD AUTO: 2.6 X10E3/UL (ref 1.4–7)
NEUTROPHILS NFR BLD AUTO: 62 %
PLATELET # BLD AUTO: 319 X10E3/UL (ref 150–450)
POTASSIUM SERPL-SCNC: 4.4 MMOL/L (ref 3.5–5.2)
PROT SERPL-MCNC: 6.7 G/DL (ref 6–8.5)
RBC # BLD AUTO: 3.84 X10E6/UL (ref 3.77–5.28)
SODIUM SERPL-SCNC: 145 MMOL/L (ref 134–144)
TIBC SERPL-MCNC: 238 UG/DL (ref 250–450)
UIBC SERPL-MCNC: 171 UG/DL (ref 118–369)
WBC # BLD AUTO: 4.2 X10E3/UL (ref 3.4–10.8)

## 2019-11-12 DIAGNOSIS — D46.9 MDS (MYELODYSPLASTIC SYNDROME) (HCC): ICD-10-CM

## 2019-11-12 RX ORDER — OXYCODONE AND ACETAMINOPHEN 10; 325 MG/1; MG/1
1 TABLET ORAL
Qty: 120 TAB | Refills: 0 | Status: CANCELLED | OUTPATIENT
Start: 2019-11-12 | End: 2019-12-12

## 2019-11-13 DIAGNOSIS — D46.9 MDS (MYELODYSPLASTIC SYNDROME) (HCC): ICD-10-CM

## 2019-11-13 RX ORDER — OXYCODONE AND ACETAMINOPHEN 10; 325 MG/1; MG/1
1 TABLET ORAL
Qty: 120 TAB | Refills: 0 | Status: SHIPPED | OUTPATIENT
Start: 2019-11-15 | End: 2019-12-10 | Stop reason: SDUPTHER

## 2019-12-10 DIAGNOSIS — D46.9 MDS (MYELODYSPLASTIC SYNDROME) (HCC): ICD-10-CM

## 2019-12-12 DIAGNOSIS — D46.9 MDS (MYELODYSPLASTIC SYNDROME) (HCC): ICD-10-CM

## 2019-12-12 RX ORDER — OXYCODONE AND ACETAMINOPHEN 10; 325 MG/1; MG/1
1 TABLET ORAL
Qty: 120 TAB | Refills: 0 | Status: SHIPPED | OUTPATIENT
Start: 2019-12-12 | End: 2020-01-14 | Stop reason: SDUPTHER

## 2019-12-13 RX ORDER — OXYCODONE AND ACETAMINOPHEN 10; 325 MG/1; MG/1
1 TABLET ORAL
Qty: 120 TAB | Refills: 0 | OUTPATIENT
Start: 2019-12-13 | End: 2020-01-12

## 2020-01-14 DIAGNOSIS — D46.9 MDS (MYELODYSPLASTIC SYNDROME) (HCC): ICD-10-CM

## 2020-01-14 RX ORDER — OXYCODONE AND ACETAMINOPHEN 10; 325 MG/1; MG/1
1 TABLET ORAL
Qty: 120 TAB | Refills: 0 | Status: SHIPPED | OUTPATIENT
Start: 2020-01-14 | End: 2020-02-11 | Stop reason: SDUPTHER

## 2020-01-22 ENCOUNTER — OFFICE VISIT (OUTPATIENT)
Dept: ONCOLOGY | Age: 70
End: 2020-01-22

## 2020-01-22 ENCOUNTER — HOSPITAL ENCOUNTER (OUTPATIENT)
Dept: ONCOLOGY | Age: 70
Discharge: HOME | End: 2020-01-22

## 2020-01-22 VITALS
RESPIRATION RATE: 16 BRPM | HEART RATE: 72 BPM | HEIGHT: 68 IN | BODY MASS INDEX: 28.64 KG/M2 | DIASTOLIC BLOOD PRESSURE: 72 MMHG | OXYGEN SATURATION: 98 % | SYSTOLIC BLOOD PRESSURE: 156 MMHG | WEIGHT: 189 LBS

## 2020-01-22 DIAGNOSIS — D46.9 MYELODYSPLASTIC SYNDROME (HCC): ICD-10-CM

## 2020-01-22 DIAGNOSIS — G89.29 CHRONIC PAIN OF MULTIPLE JOINTS: ICD-10-CM

## 2020-01-22 DIAGNOSIS — M25.50 CHRONIC PAIN OF MULTIPLE JOINTS: ICD-10-CM

## 2020-01-22 DIAGNOSIS — D46.9 MDS (MYELODYSPLASTIC SYNDROME) (HCC): ICD-10-CM

## 2020-01-22 DIAGNOSIS — E55.9 VITAMIN D DEFICIENCY: ICD-10-CM

## 2020-01-22 DIAGNOSIS — D46.4 REFRACTORY ANEMIA DUE TO MYELODYSPLASTIC SYNDROME (HCC): ICD-10-CM

## 2020-01-22 DIAGNOSIS — D46.9 MDS (MYELODYSPLASTIC SYNDROME) (HCC): Primary | ICD-10-CM

## 2020-01-22 LAB
BASO+EOS+MONOS # BLD AUTO: 0.5 K/UL (ref 0–2.3)
BASO+EOS+MONOS NFR BLD AUTO: 9 % (ref 0.1–17)
DIFFERENTIAL METHOD BLD: ABNORMAL
ERYTHROCYTE [DISTWIDTH] IN BLOOD BY AUTOMATED COUNT: 14.4 % (ref 11.5–14.5)
HCT VFR BLD AUTO: 34.6 % (ref 36–48)
HGB BLD-MCNC: 11.6 G/DL (ref 12–16)
LYMPHOCYTES # BLD: 1.3 K/UL (ref 1.1–5.9)
LYMPHOCYTES NFR BLD: 25 % (ref 14–44)
MCH RBC QN AUTO: 31.9 PG (ref 25–35)
MCHC RBC AUTO-ENTMCNC: 33.5 G/DL (ref 31–37)
MCV RBC AUTO: 95.1 FL (ref 78–102)
NEUTS SEG # BLD: 3.6 K/UL (ref 1.8–9.5)
NEUTS SEG NFR BLD: 66 % (ref 40–70)
PLATELET # BLD AUTO: 304 K/UL (ref 140–440)
RBC # BLD AUTO: 3.64 M/UL (ref 4.1–5.1)
WBC # BLD AUTO: 5.4 K/UL (ref 4.5–13)

## 2020-01-22 NOTE — PATIENT INSTRUCTIONS
Myelodysplastic Syndromes: Care Instructions Your Care Instructions Myelodysplastic syndromes, also called MDS, are a group of rare conditions in which the bone marrow does not make enough healthy blood cells. Normally, the bone marrow makes red blood cells, white blood cells, and platelets. These cells carry oxygen in the blood, help the body fight infections, and help the blood clot. With MDS, you may feel weak and tired, get infections often, and bruise easily, although symptoms tend to vary. MDS is a form of blood cancer. In some cases, MDS can turn into acute myeloid leukemia (AML), another type of cancer. Some people develop MDS after treatment for cancer or exposure to pesticides or other chemicals. But in most cases, the cause of MDS is not known. Your doctor will use the results of blood tests to guide your treatment. There are many types of MDS, with different treatment plans for each. If you have enough red blood cells and are feeling all right, you may not need active treatment, but you and your doctor will want to watch your condition carefully. If you start feeling lightheaded and have no energy, you may need a blood transfusion. Your doctor also may give you antibiotics to prevent or treat infection. Follow-up care is a key part of your treatment and safety. Be sure to make and go to all appointments, and call your doctor if you are having problems. It's also a good idea to know your test results and keep a list of the medicines you take. How can you care for yourself at home? · Take your medicines exactly as prescribed. Call your doctor if you think you are having a problem with your medicine. You will get more details on the specific medicines your doctor prescribes. · If your doctor prescribed antibiotics, take them as directed. Do not stop taking them just because you feel better. You need to take the full course of antibiotics.  If you have side effects from antibiotics, tell your doctor. · Take steps to control your stress and workload. Learn relaxation techniques. ? Share your feelings. Stress and tension affect our emotions. By expressing your feelings to others, you may be able to understand and cope with them. ? Consider joining a support group. Talking about a problem with your spouse, a good friend, or other people with similar problems is a good way to reduce tension and stress. ? Express yourself with art. Try writing, crafts, dance, or art to relieve stress. ? Be kind to your body and mind. Getting enough sleep, eating a healthy diet, and taking time to do things you enjoy can contribute to an overall feeling of balance in your life and help reduce stress. ? Get help if you need it. Discuss your concerns with your doctor or counselor. · Do not smoke. Smoking can make blood problems worse. If you need help quitting, talk to your doctor about stop-smoking programs and medicines. These can increase your chances of quitting for good. · If you have not already done so, prepare a list of advance directives. Advance directives are instructions to your doctor and family members about what kind of care you want if you become unable to speak or express yourself. · Call the [employer]pany (4-293.917.8374) or visit its website at [employer] for more information. When should you call for help? Call 911 anytime you think you may need emergency care.  For example, call if: 
  · You passed out (lost consciousness).  
 Call your doctor now or seek immediate medical care if: 
  · You have a fever.  
  · You have abnormal bleeding.  
  · You have new or worse pain.  
  · You think you have an infection.  
  · You have new symptoms, such as a cough, belly pain, vomiting, diarrhea, or a rash.  
 Watch closely for changes in your health, and be sure to contact your doctor if: 
  · You are much more tired than usual.  
   · You have swollen glands in your armpits, groin, or neck.  
  · You do not get better as expected. Where can you learn more? Go to http://marjorie-marianela.info/. Enter Te Mack in the search box to learn more about \"Myelodysplastic Syndromes: Care Instructions. \" Current as of: December 19, 2018 Content Version: 12.2 © 0332-8612 [employer]. Care instructions adapted under license by [employer] (which disclaims liability or warranty for this information). If you have questions about a medical condition or this instruction, always ask your healthcare professional. [place] [address] any warranty or liability for your use of this information. Learning About Vitamin D Why is it important to get enough vitamin D? Your body needs vitamin D to absorb calcium. Calcium keeps your bones and muscles, including your heart, healthy and strong. If your muscles don't get enough calcium, they can cramp, hurt, or feel weak. You may have long-term (chronic) muscle aches and pains. If you don't get enough vitamin D throughout life, you have an increased chance of having thin and brittle bones (osteoporosis) in your later years. Children who don't get enough vitamin D may not grow as much as others their age. They also have a chance of getting a rare disease called rickets. It causes weak bones. Vitamin D and calcium are added to many foods. And your body uses sunshine to make its own vitamin D. How much vitamin D do you need? The [employer] recommends that people ages 3 through 79 get 600 IU (international units) every day. Adults 71 and older need 800 IU every day. Blood tests for vitamin D can check your vitamin D level. But there is no standard normal range used by all laboratories. You're likely getting enough vitamin D if your levels are in the range of 20 to 50 ng/mL. How can you get more vitamin D? Foods that contain vitamin D include: · [place], tuna, and mackerel. These are some of the best foods to eat when you need to get more vitamin D. 
· Cheese, egg yolks, and beef liver. These foods have vitamin D in small amounts. · Milk, soy drinks, orange juice, yogurt, margarine, and some kinds of cereal have vitamin D added to them. Some people don't make vitamin D as well as others. They may have to take extra care in getting enough vitamin D. Things that reduce how much vitamin D your body makes include: · Dark skin, such as many  Americans have. · Age, especially if you are older than 72. · Digestive problems, such as Crohn's or celiac disease. · Liver and kidney disease. Some people who do not get enough vitamin D may need supplements. Are there any risks from taking vitamin D? 
· Too much vitamin D: 
? Can damage your kidneys. ? Can cause nausea and vomiting, constipation, and weakness. ? Raises the amount of calcium in your blood. If this happens, you can get confused or have an irregular heart rhythm. · Vitamin D may interact with other medicines. Tell your doctor about all of the medicines you take, including over-the-counter drugs, herbs, and pills. Tell your doctor about all of your current medical problems. Where can you learn more? Go to http://marjorie-marianela.info/. Enter D229 in the search box to learn more about \"Learning About Vitamin D.\" 
Current as of: November 7, 2018 Content Version: 12.2 © 1566-8399 [employer]. Care instructions adapted under license by [employer] (which disclaims liability or warranty for this information). If you have questions about a medical condition or this instruction, always ask your healthcare professional. [place] [address] any warranty or liability for your use of this information.

## 2020-01-22 NOTE — PROGRESS NOTES
Hematology/Oncology  Progress Note    Name: Jaylene Stark  Date: 2020  : 1950    PCP:Khadra Espinoza MD      Ms. Savita Thomas is a 68year old female who was seen for management of her myelodysplastic syndrome/refractory anemia. The patient also has quite severe arthritis. Current therapy: Percocet 10 mg 1 tablet every 6 hours as needed for pain control    Subjective:     Ms. Savita Thomas is a 17-year-old [place] American woman, who is being seen in the office today for a follow up for her myelodysplastic syndrome with refractory anemia. She also has severe arthritis involving her neck, back, wrist and knees. She rates her pain 7/10 today primarily in her back, and her right knee. The patient continues to use Percocet as needed for her pain. She is ambulating with the use of a cane at this time. She report that she recently stop phycial therapy because she was in too much pain. Her appetite and energy level are good. She denies fevers, chest pains, or shortness of breath. She has no additional complaints or concerns. She denies having any blood in her urine or stool. Past medical history, family history, and social history: these were reviewed and remains unchanged.     Past Medical History:   Diagnosis Date    Chronic pain     Chronic leg pain    Hypercholesterolemia     Hypertension     Myelodysplastic syndrome, unspecified ([address]     Refractory Anemia      Past Surgical History:   Procedure Laterality Date   Ludie Cage SURGERY  [id number]    Work related back injury     Social History     Socioeconomic History    Marital status:      Spouse name: Not on file    Number of children: Not on file    Years of education: Not on file    Highest education level: Not on file   Occupational History    Not on file   Social Needs    Financial resource strain: Not on file    Food insecurity:     Worry: Not on file     Inability: Not on file    Transportation needs:     Medical: Not on file Non-medical: Not on file   Tobacco Use    Smoking status: Current Every Day Smoker     Packs/day: 1.50     Types: Cigarettes    Smokeless tobacco: Current User   Substance and Sexual Activity    Alcohol use: Yes     Comment: Occasional - beer    Drug use: Not on file    Sexual activity: Not on file   Lifestyle    Physical activity:     Days per week: Not on file     Minutes per session: Not on file    Stress: Not on file   Relationships    Social connections:     Talks on phone: Not on file     Gets together: Not on file     Attends Muslim service: Not on file     Active member of club or organization: Not on file     Attends meetings of clubs or organizations: Not on file     Relationship status: Not on file    Intimate partner violence:     Fear of current or ex partner: Not on file     Emotionally abused: Not on file     Physically abused: Not on file     Forced sexual activity: Not on file   Other Topics Concern    Not on file   Social History Narrative    Not on file     History reviewed. No pertinent family history. Current Outpatient Medications   Medication Sig Dispense Refill    oxyCODONE-acetaminophen (PERCOCET 10)  mg per tablet Take 1 Tab by mouth every six (6) hours as needed for Pain for up to 30 days. Max Daily Amount: 4 Tabs. Indications: chronic pain 120 Tab 0    ergocalciferol (VITAMIN D2) 50,000 unit capsule Take 1 Cap by mouth every seven (7) days. 12 Cap 2    naloxone (NARCAN) 2 mg/actuation spry Use 1 spray intranasally into 1 nostril. Use a new Narcan nasal spray for subsequent doses and administer into alternating nostrils. May repeat every 2 to 3 minutes as needed. Indications: OPIATE-INDUCED RESPIRATORY DEPRESSION, OPIOID TOXICITY 1 Box 0    iron polysaccharides (FERREX 150) 150 mg iron capsule Take 1 Cap by mouth two (2) times a day. 90 Cap 6    ferrous sulfate 325 mg (65 mg iron) tablet Take 1 Tab by mouth Daily (before breakfast).  90 Tab 3    pantoprazole (PROTONIX) 40 mg tablet Take 1 Tab by mouth daily. 30 Tab 1    PROAIR HFA 90 mcg/actuation inhaler       gabapentin (NEURONTIN) 300 mg capsule       cyclobenzaprine (FLEXERIL) 10 mg tablet Take 1 Tab by mouth three (3) times daily as needed for Muscle Spasm(s). 90 Tab 6    benazepril (LOTENSIN) 20 mg tablet Take 20 mg by mouth daily.  pravastatin (PRAVACHOL) 20 mg tablet Take 20 mg by mouth nightly.  amLODIPine (NORVASC) 10 mg tablet Take  by mouth daily. Review of Systems  Constitutional: The patient has complains of pain in the neck due to severe arthritis. HEENT: The patient denies recent head trauma, eye pain, blurred vision,  hearing deficit, oropharyngeal mucosal pain or lesions, and the patient denies throat pain or discomfort. She is experiencing watery eyes is sneezing from the effects of the environmental/pollen allergy. Lymphatics: The patient denies palpable peripheral lymphadenopathy. Hematologic: The patient denies having bruising, bleeding, or progressive fatigue. Respiratory: Patient denies having shortness of breath, cough, sputum production, fever, or dyspnea on exertion. Cardiovascular: The patient denies having leg pain, leg swelling, heart palpitations, chest permit, chest pain, or lightheadedness. The patient denies having dyspnea on exertion. Gastrointestinal: The patient denies having nausea, emesis, or diarrhea. The patient denies having any hematemesis or blood in the stool. Genitourinary: Patient denies having urinary urgency, frequency, or dysuria. The patient denies having blood in the urine. Psychological: The patient denies having symptoms of nervousness, anxiety, depression, or thoughts of harming himself some of this. Skin: Patient denies having skin rashes, skin, ulcerations, or unexplained itching or pruritus. Musculoskeletal: The patient complains of in her right knee, and her left hand.       Objective:     Visit Vitals  /72   Pulse 72 Resp 16   Ht 5' 8\" (1.727 m)   Wt 85.7 kg (189 lb)   SpO2 98%   BMI 28.74 kg/m²     Pain 8/10 ECOG 0  Physical Exam:   Gen. Appearance: The patient is in no acute distress. Skin: There is no bruise or rash. HEENT: The exam is unremarkable. Neck: Supple without lymphadenopathy or thyromegaly. Lungs: Clear to auscultation and percussion; there are no wheezes or rhonchi. Heart: Regular rate and rhythm; there are no murmurs, gallops, or rubs. Abdomen: Bowel sounds are present and normal.  There is no guarding, tenderness, or hepatosplenomegaly. Extremities: There is no clubbing, cyanosis, or edema. Neurologic: There are no focal neurologic deficits. Lymphatics: There is no palpable peripheral lymphadenopathy. Musculoskeletal: Joint stiffness in her left hand. The patient has some deformity in her knee joints bilaterally. She is using a cane for mobility support. Psychological/psychiatric: There is no clinical evidence of anxiety, depression, or melancholy. Lab data:      Results for orders placed or performed during the hospital encounter of 01/22/20   CBC WITH 3 PART DIFF     Status: Abnormal   Result Value Ref Range Status    WBC 5.4 4.5 - 13.0 K/uL Final    RBC 3.64 (L) 4.10 - 5.10 M/uL Final    HGB 11.6 (L) 12.0 - 16 g/dL Final    HCT 34.6 (L) 36 - 48 % Final    MCV 95.1 78 - 102 FL Final    MCH 31.9 25.0 - 35.0 PG Final    MCHC 33.5 31 - 37 g/dL Final    RDW 14.4 11.5 - 14.5 % Final    PLATELET 069 537 - 921 K/uL Final    NEUTROPHILS 66 40 - 70 % Final    MIXED CELLS 9 0.1 - 17 % Final    LYMPHOCYTES 25 14 - 44 % Final    ABS. NEUTROPHILS 3.6 1.8 - 9.5 K/UL Final    ABS. MIXED CELLS 0.5 0.0 - 2.3 K/uL Final    ABS. LYMPHOCYTES 1.3 1.1 - 5.9 K/UL Final     Comment: Test performed at [address] [place] [address] or [facility]patient [facility] Location. Reviewed by Medical Director. DF AUTOMATED   Final           Assessment:     1. MDS (myelodysplastic syndrome) ([facility]    2. Chronic pain of multiple joints    3. Vitamin D deficiency    4. Refractory anemia due to myelodysplastic syndrome ([facility]    5. Myelodysplastic syndrome ([facility]      Plan:     Myelodysplastic syndrome/refractory anemia: I have explained to the patient that her CBC today shows a WBC of 5.4. Hemoglobin of 11.6 g/dl and hematocrit of 34.6%. PLT of 304,000. We will continue to monitor her H/H every 3 months and the patient will be offered Procrit if her Hgb and Hct declines below 10 g and 30%. Arthritis(progressive problem): The patient continues to complain of severe arthritic pain. Today the pain is primarily in her right knee with joint stiffness and pain in her back. This morning she rates her pain 7/10 after taking her pain medication. Her prescription for Percocet 10 mg will be given on as needed basis. She also report getting injections as well. Vitamin D Deficiency: The patient was informed that her Vitamin D on 4/10/2019 was normal at 39.8 ng/mL. She has previously completed a full course of vitamin D 50,000. I will recheck a vitamin D at this time. She was advised to follow up in 4 months.      Orders Placed This Encounter    COMPLETE CBC & AUTO DIFF WBC    InHouse CBC (varinode)     Standing Status:   Future     Number of Occurrences:   1     Standing Expiration Date:   7/16/6901    METABOLIC PANEL, COMPREHENSIVE     Standing Status:   Future     Number of Occurrences:   1     Standing Expiration Date:   1/22/2021    VITAMIN D, 25 HYDROXY     Standing Status:   Future     Number of Occurrences:   1     Standing Expiration Date:   2/22/2020    FERRITIN     Standing Status:   Future     Number of Occurrences:   1     Standing Expiration Date:   1/22/2021    IRON PROFILE     Standing Status:   Future     Number of Occurrences:   1     Standing Expiration Date:   1/22/2021       ASHLEY Freeman  1/22/2020     I have assessed the patient independently and  agree with the full assessment as outlined.   Ladarius Fraga MD, [place]

## 2020-01-23 LAB
25(OH)D3+25(OH)D2 SERPL-MCNC: 22.5 NG/ML (ref 30–100)
ALBUMIN SERPL-MCNC: 4.4 G/DL (ref 3.7–4.7)
ALBUMIN/GLOB SERPL: 2.1 {RATIO} (ref 1.2–2.2)
ALP SERPL-CCNC: 78 IU/L (ref 39–117)
ALT SERPL-CCNC: 11 IU/L (ref 0–32)
AST SERPL-CCNC: 12 IU/L (ref 0–40)
BILIRUB SERPL-MCNC: <0.2 MG/DL (ref 0–1.2)
BUN SERPL-MCNC: 6 MG/DL (ref 8–27)
BUN/CREAT SERPL: 14 (ref 12–28)
CALCIUM SERPL-MCNC: 9.6 MG/DL (ref 8.7–10.3)
CHLORIDE SERPL-SCNC: 104 MMOL/L (ref 96–106)
CO2 SERPL-SCNC: 25 MMOL/L (ref 20–29)
CREAT SERPL-MCNC: 0.43 MG/DL (ref 0.57–1)
FERRITIN SERPL-MCNC: 155 NG/ML (ref 15–150)
GLOBULIN SER CALC-MCNC: 2.1 G/DL (ref 1.5–4.5)
GLUCOSE SERPL-MCNC: 93 MG/DL (ref 65–99)
IRON SATN MFR SERPL: 23 % (ref 15–55)
IRON SERPL-MCNC: 53 UG/DL (ref 27–139)
POTASSIUM SERPL-SCNC: 4.3 MMOL/L (ref 3.5–5.2)
PROT SERPL-MCNC: 6.5 G/DL (ref 6–8.5)
SODIUM SERPL-SCNC: 143 MMOL/L (ref 134–144)
TIBC SERPL-MCNC: 226 UG/DL (ref 250–450)
UIBC SERPL-MCNC: 173 UG/DL (ref 118–369)

## 2020-01-24 DIAGNOSIS — E55.9 VITAMIN D DEFICIENCY: Primary | ICD-10-CM

## 2020-01-24 RX ORDER — ERGOCALCIFEROL 1.25 MG/1
50000 CAPSULE ORAL
Qty: 12 CAP | Refills: 2 | Status: SHIPPED | OUTPATIENT
Start: 2020-01-24

## 2020-02-11 DIAGNOSIS — D46.9 MDS (MYELODYSPLASTIC SYNDROME) (HCC): ICD-10-CM

## 2020-02-11 RX ORDER — OXYCODONE AND ACETAMINOPHEN 10; 325 MG/1; MG/1
1 TABLET ORAL
Qty: 120 TAB | Refills: 0 | Status: SHIPPED | OUTPATIENT
Start: 2020-02-11 | End: 2020-03-11 | Stop reason: SDUPTHER

## 2020-03-11 DIAGNOSIS — D46.9 MDS (MYELODYSPLASTIC SYNDROME) (HCC): ICD-10-CM

## 2020-03-11 RX ORDER — OXYCODONE AND ACETAMINOPHEN 10; 325 MG/1; MG/1
1 TABLET ORAL
Qty: 120 TAB | Refills: 0 | Status: SHIPPED | OUTPATIENT
Start: 2020-03-11 | End: 2020-04-10

## 2020-04-20 NOTE — TELEPHONE ENCOUNTER
Pt needs refill on oxyCODONE-acetaminophen (PERCOCET 10)  mg per tablet. Not listed to select for refill. Patient just got out of hospital and they wouldn't give her pain meds.

## 2020-04-21 NOTE — TELEPHONE ENCOUNTER
Patient called back today, 04/21/2020, pt needs to cancel order for pain meds. Pt will be following Dr. Edward Livingston. He was taking care of her while in hospital. Pt stated she will be following him since Dr. Nancy Moreno is retiring.